# Patient Record
Sex: FEMALE | Race: WHITE | NOT HISPANIC OR LATINO | Employment: UNEMPLOYED | URBAN - METROPOLITAN AREA
[De-identification: names, ages, dates, MRNs, and addresses within clinical notes are randomized per-mention and may not be internally consistent; named-entity substitution may affect disease eponyms.]

---

## 2018-10-15 ENCOUNTER — OFFICE VISIT (OUTPATIENT)
Dept: OTOLARYNGOLOGY | Facility: CLINIC | Age: 48
End: 2018-10-15
Payer: COMMERCIAL

## 2018-10-15 VITALS — SYSTOLIC BLOOD PRESSURE: 149 MMHG | HEART RATE: 57 BPM | DIASTOLIC BLOOD PRESSURE: 87 MMHG | WEIGHT: 180.56 LBS

## 2018-10-15 DIAGNOSIS — J39.8 TRACHEAL STENOSIS: Primary | ICD-10-CM

## 2018-10-15 DIAGNOSIS — K21.9 GASTROESOPHAGEAL REFLUX DISEASE, ESOPHAGITIS PRESENCE NOT SPECIFIED: ICD-10-CM

## 2018-10-15 DIAGNOSIS — I10 HYPERTENSION, UNSPECIFIED TYPE: ICD-10-CM

## 2018-10-15 PROCEDURE — 99999 PR PBB SHADOW E&M-EST. PATIENT-LVL III: CPT | Mod: PBBFAC,,, | Performed by: OTOLARYNGOLOGY

## 2018-10-15 PROCEDURE — 99203 OFFICE O/P NEW LOW 30 MIN: CPT | Mod: 25,S$GLB,, | Performed by: OTOLARYNGOLOGY

## 2018-10-15 PROCEDURE — 31575 DIAGNOSTIC LARYNGOSCOPY: CPT | Mod: S$GLB,,, | Performed by: OTOLARYNGOLOGY

## 2018-10-15 RX ORDER — BISOPROLOL FUMARATE AND HYDROCHLOROTHIAZIDE 10; 6.25 MG/1; MG/1
1 TABLET ORAL DAILY
COMMUNITY
End: 2019-01-03 | Stop reason: SDUPTHER

## 2018-10-15 RX ORDER — LANSOPRAZOLE 30 MG/1
30 CAPSULE, DELAYED RELEASE ORAL DAILY
COMMUNITY
End: 2019-01-03

## 2018-10-15 RX ORDER — FEXOFENADINE HCL AND PSEUDOEPHEDRINE HCI 180; 240 MG/1; MG/1
1 TABLET, EXTENDED RELEASE ORAL DAILY
COMMUNITY
End: 2020-09-18

## 2018-10-15 RX ORDER — ZOLPIDEM TARTRATE 10 MG/1
5 TABLET ORAL NIGHTLY PRN
COMMUNITY
End: 2019-01-03 | Stop reason: SDUPTHER

## 2018-10-15 NOTE — PROGRESS NOTES
Chief Complaint   Patient presents with    Consult     tracheal stenosis       HPI   48 y.o. female presents with a history of idiopathic SGS/tracheal stenosis.  She recently moved to [facility] from [place] and has undergone multiple endoscopic procedures for tracheal stenosis dating back to 2006.  Her most recent procedure was in 12/12016.  She has no complaints today.  She is breathing well and does not feel that her breathing limits her activities.     Review of Systems   Constitutional: Negative for fatigue and unexpected weight change.   HENT: Per HPI.  Eyes: Negative for visual disturbance.   Respiratory: Negative for shortness of breath, hemoptysis   Cardiovascular: Negative for chest pain and palpitations.   Musculoskeletal: Negative for decreased ROM, back pain.   Skin: Negative for rash, sunburn, itching.   Neurological: Negative for dizziness and seizures.   Hematological: Negative for adenopathy. Does not bruise/bleed easily.   Endocrine: Negative for rapid weight loss/weight gain, heat/cold intolerance.     Past Medical History   Patient Active Problem List   Diagnosis    Tracheal stenosis    Hypertension    Gastroesophageal reflux disease           Past Surgical History   Past Surgical History:   Procedure Laterality Date    LARYNGOSCOPY      Multiple endoscopic procedures for SGS/tracheal stenosis dating to 2006.         Family History   History reviewed. No pertinent family history.        Social History   .  Social History     Socioeconomic History    Marital status: Unknown     Spouse name: Not on file    Number of children: Not on file    Years of education: Not on file    Highest education level: Not on file   Social Needs    Financial resource strain: Not on file    Food insecurity - worry: Not on file    Food insecurity - inability: Not on file    Transportation needs - medical: Not on file    Transportation needs - non-medical: Not on file   Occupational History    Not on file   Tobacco  Use    Smoking status: Never Smoker    Smokeless tobacco: Never Used   Substance and Sexual Activity    Alcohol use: Not on file    Drug use: Not on file    Sexual activity: Not on file   Other Topics Concern    Not on file   Social History Narrative    Not on file         Allergies   Review of patient's allergies indicates:   Allergen Reactions    Codeine            Physical Exam     Vitals:    10/15/18 1001   BP: (!) 149/87   Pulse: (!) 57         There is no height or weight on file to calculate BMI.      General: AOx3, NAD   Respiratory:  Symmetric chest rise, normal effort.  Soft, inconsistent inspiratory stridor.   Nose: No gross nasal septal deviation. Inferior Turbinates WNL bilaterally. No septal perforation. No masses/lesions.   Oral Cavity:  Oral Tongue mobile, no lesions noted. Hard Palate WNL. No buccal or FOM lesions.  Oropharynx:  No masses/lesions of the posterior pharyngeal wall. Tonsillar fossa without lesions. Soft palate without masses. Midline uvula.   Neck: No scars.  No cervical lymphadenopathy, thyromegaly or thyroid nodules.  Normal range of motion.    Face: House Brackmann I bilaterally.    Flex Naso Bharati Hypo Procedures #2    Procedure:  Diagnostic flexible nasopharyngoscopy, laryngoscopy and hypopharyngoscopy:    Routine preparation with local atomizer with 1% neosynephrine/pontocaine with customary flexible endoscope.    Nasopharynx:  No lesions.   Mucosa:  No lesions.   Adenoids:  Present.  Posterior Choanae:  Patent.  Eustachian Tubes:  Patent.  Posterior pharynx:  No lesions.  Larynx/hypopharynx:   Epiglottis:  No lesions, without edema.   AE Folds:  No lesions.   Vocal cords:  No polyps, nodules, ulcers or lesions.   Mobility:  Equal and normal bilateral.   Hypopharynx:  No lesions.   Piriform sinus:  No pooling, no lesions.   Post Cricoid:  No erythema, no edema.    Additional Findings: ~40% SGS, proximal tracheal stenosis.     Assessment/Plan  Problem List Items Addressed  This Visit        Cardiac/Vascular    Hypertension       ID    Tracheal stenosis - Primary     Idiopathic tracheal stenosis status post multiple endoscopic procedures.  She is nearly 2 years out from her last surgery and is doing quite well.  I will arrange for her to establish care with Dr. Mendoza.             GI    Gastroesophageal reflux disease

## 2018-10-15 NOTE — ASSESSMENT & PLAN NOTE
Idiopathic tracheal stenosis status post multiple endoscopic procedures.  She is nearly 2 years out from her last surgery and is doing quite well.  I will arrange for her to establish care with Dr. Mendoza.

## 2018-10-23 ENCOUNTER — TELEPHONE (OUTPATIENT)
Dept: OTOLARYNGOLOGY | Facility: CLINIC | Age: 48
End: 2018-10-23

## 2018-10-23 NOTE — TELEPHONE ENCOUNTER
----- Message from Josiane Soto RN sent at 10/22/2018  5:01 PM CDT -----  Contact: patient       ----- Message -----  From: Loraineazeem Mead  Sent: 10/22/2018   3:20 PM  To: Reji FRANCO Staff    Needs Advice    Reason for call: Patient would like to speak to nurse reg having records transferred to [facility] office.         Communication Preference: [phone]

## 2018-10-23 NOTE — TELEPHONE ENCOUNTER
----- Message from Loraine Mead sent at 10/23/2018 10:58 AM CDT -----  Contact: patient   Patient Returning Call from [facility]    Who Left Message for Patient: Rosalba    Communication Preference: [phone]

## 2018-10-29 ENCOUNTER — OFFICE VISIT (OUTPATIENT)
Dept: OTOLARYNGOLOGY | Facility: CLINIC | Age: 48
End: 2018-10-29
Payer: COMMERCIAL

## 2018-10-29 VITALS
WEIGHT: 182.56 LBS | TEMPERATURE: 99 F | HEART RATE: 82 BPM | SYSTOLIC BLOOD PRESSURE: 158 MMHG | DIASTOLIC BLOOD PRESSURE: 90 MMHG

## 2018-10-29 DIAGNOSIS — R49.0 DYSPHONIA: ICD-10-CM

## 2018-10-29 DIAGNOSIS — J39.8 TRACHEAL STENOSIS: Primary | ICD-10-CM

## 2018-10-29 DIAGNOSIS — J38.6 SUBGLOTTIC STENOSIS: ICD-10-CM

## 2018-10-29 PROCEDURE — 99215 OFFICE O/P EST HI 40 MIN: CPT | Mod: 25,S$GLB,, | Performed by: OTOLARYNGOLOGY

## 2018-10-29 PROCEDURE — 31575 DIAGNOSTIC LARYNGOSCOPY: CPT | Mod: S$GLB,,, | Performed by: OTOLARYNGOLOGY

## 2018-10-29 PROCEDURE — 99999 PR PBB SHADOW E&M-EST. PATIENT-LVL IV: CPT | Mod: PBBFAC,,, | Performed by: OTOLARYNGOLOGY

## 2018-10-29 RX ORDER — DEXAMETHASONE SODIUM PHOSPHATE 4 MG/ML
8 INJECTION, SOLUTION INTRA-ARTICULAR; INTRALESIONAL; INTRAMUSCULAR; INTRAVENOUS; SOFT TISSUE
Status: CANCELLED | OUTPATIENT
Start: 2018-10-29

## 2018-10-29 RX ORDER — LIDOCAINE HYDROCHLORIDE 10 MG/ML
1 INJECTION, SOLUTION EPIDURAL; INFILTRATION; INTRACAUDAL; PERINEURAL ONCE
Status: CANCELLED | OUTPATIENT
Start: 2018-10-29 | End: 2018-10-29

## 2018-10-29 NOTE — H&P (VIEW-ONLY)
"[facility]  Department of Otorhinolaryngology and Communication Sciences    Denise Fry is a 48 y.o. female who [facility] self-referred for further management of subglottic stenosis. She has idiopathic stenosis identified and treated at [facility]. She and her  recently relocated to [place] on account of his job.    She complains of shortness of breath. Difficulty getting air in. She gets winded easily on stairs or when walking the dog.  She breathes comfortably at rest.  Onset was gradual. Duration is 10+ years. Time course is intermittent. Symptoms are worsening over the last several months. Last surgery was 12/2016. Exacerbating factors include exertion. Alleviating factors include surgical management. She denies any associated symptoms.  Some coughing due to drainage at this time. They have obtained biopsies and have checked autoimmune labs. All negative.    She reports that in general she has felt better right after surgery. However, a couple times when post-surgery she has needed to go to the ER. Says that sometimes her airway would get dry and crusty. From what she can recall, they used [facility] every trip to the OR. Does recall that over time her stenosis has expanded to involve now "2 segments." On PPI under rec of ENT; has never seen GI.    Voice Handicap Index-10 (VHI-10) score is 0.   Reflux Symptom Index (RSI) score is 15.   Eating Assessment Tool-10 (EAT-10) score is 0.   Dyspnea Index (DI) score is 35.  Cough Severity Index (CSI) score is 0.    Past Medical History  SGS  HTN  Anxiety    Past Surgical History  She has a past surgical history that includes Laryngoscopy.   June 2008, Nov 2010, Jan 2013, Jan 2014, June 2014, Feb 2015, Aug 2015, Dec 2016: all DL with laser/dilation/[facility]     2005 Carpal tunnel and elbow surgery  1991, 1994: C section    Family History  Dad - diabetes, HTN, cancer  Brother - ?[employer]  Uncle - SGS?    Social History  She reports " that  has never smoked. she has never used smokeless tobacco.    Allergies  She is allergic to codeine.    Medications  She has a current medication list which includes the following prescription(s): bisoprolol-hydrochlorothiazide, fexofenadine-pseudoephedrine, lansoprazole, and zolpidem.    Review of Systems   Constitutional: Negative for fever.   HENT: Negative for sore throat.    Eyes: Negative for visual disturbance.   Respiratory: Positive for wheezing.    Cardiovascular: Negative for chest pain.   Gastrointestinal: Negative for nausea.   Musculoskeletal: Negative for arthralgias.   Skin: Negative for rash.   Neurological: Negative for tremors.   Hematological: Does not bruise/bleed easily.   Psychiatric/Behavioral: The patient is not nervous/anxious.           Objective:     BP (!) 158/90   Pulse 82   Temp 98.8 °F (37.1 °C)   Wt 82.8 kg (182 lb 8.7 oz)      Physical Exam    Constitutional: comfortable, well dressed  Psychiatric: appropriate affect  Respiratory: comfortably breathing, symmetric chest rise, no stridor; trace/faint inspiratory stridor  Voice: mild variable breathiness  Cardiovascular: upper extremities non-edematous  Lymphatic: no cervical lymphadenopathy  Neurologic: alert and oriented to time, place, person, and situation; cranial nerves 3-12 grossly intact  Head: normocephalic  Eyes: conjunctivae and sclerae clear  Ears: normal pinnae, normal external auditory canals, tympanic membranes intact  Nose: mucosa pink and noncongested, no masses, no mucopurulence, no polyps  Oral cavity / oropharynx: no mucosal lesions  Neck: soft, full range of motion, laryngotracheal complex palpable with appropriate landmarks, larynx elevates on swallowing  Indirect laryngoscopy: limited due to gag    Procedure  Flexible Laryngoscopy (27129): Laryngoscopy is indicated for assessment of upper aerodigestive structure and function. This was carried out transnasally with a distal chip videoendoscope. After verbal  consent was obtained, the patient was positioned and the nose was topically decongested with 1% phenylephrine and topically anesthetized with 4% lidocaine. The endoscope was passed through the most patent nasal cavity and positioned to image the nasopharynx, larynx, and hypopharynx in detail. The following features were examined: nasopharyngeal, laryngeal, hypopharyngeal masses; velopharyngeal strength, closure, and symmetry of motion; vocal fold range and symmetry of motion; laryngeal mucosal edema, erythema, inflammation, and hydration; salivary pooling; and gross laryngeal sensation. The equipment was removed. The patient tolerated the procedure well without complication. All findings were normal except:  - corkscrewing stenosis beginning at posterior glottis, primarily right sided, tracking through subglottis into proximal trachea; airway lumen narrowest at distalmost end, where lumen estimated at ~5mm  - right vocal fold with slightly limited lateral excursion and slightly elongated        Assessment:     Denise Fry is a 48 y.o. female with idiopathic laryngotracheal stenosis, primarily centered at the subglottis/trachea, though with some apparent encroachment into the posterior glottis.       Plan:        I had a discussion with the patient regarding her condition and the further workup and management options.      I recommended obtaining an updated anatomical assessment of her airway framework with a CT scan. Furthermore, I offered her to return to the OR for rigid airway endoscopy, possible laser incision/excision, possible dilation, possible steroid injection. Due to her prior history of sometimes retuning to the ED with obstructive crusts, I recommended refraining from use of topical mitomycin C. I spoke with her about the potential necessity for tracheostomy, either intraoperatively or postoperatively, as is always the case when considering endoscopic airway surgery. I discussed the risks, benefits  and alternatives to surgery with the patient, as well as the expected postoperative course. Risks included but were not limited to pain; bleeding; infection; scarring; worsening of voice; worsening of swallowing; aspiration; recurrence; need for additional and/or more extensive procedures; laryngotracheal rupture; pharyngoesophageal leak/fistula/mediastinitis; oral/dental problems (pain, laceration, broken or missing teeth); jaw joint problems (pain, dislocation); and tongue problems (pain, laceration, numbness, weakness, taste disturbance). Also inherent in the procedure are the risks of general anesthesia, including but not limited to cardiovascular complications (heart attack, arrhythmia); pulmonary (respiratory failure); neurologic (stroke); and death. I also explained that, if the laser is used, it presents the risks of vision loss and fire. I gave her the opportunity to ask questions and I answered all of them to her satisfaction. She wishes to proceed. We will arrange this in the coming weeks. Overnight observation is recommended. She will have preop testing triage by the anesthesiology team.    Depending on her progress, she may benefit from office-based steroid injections afterwards.    All questions were answered, and the patient is in agreement with the above.     This visit was 40 minutes in duration, with over 50% of the time spent in direct face-to-face counseling and coordination of care regarding the issues outlined above.    August Mendoza M.D.  [facility]  Department of Otorhinolaryngology and Communication Sciences

## 2018-10-29 NOTE — PRE-PROCEDURE INSTRUCTIONS
Pre-op interview completed via phone.  PreOp Instructions given:  - Medication information (what to hold and what to take)  - NPO guidelines. Follow surgeon's instructions.  - Arrival place directions given  - Time to be given the day before procedure by the  Surgeon's Office  - Bathing with antibacterial soap/Hibiclens   - Don't wear any jewelry or bring any valuables AM of surgery  - No makeup or moisturizer to face  - No perfume/cologne, powder, lotions or aftershave    Food and Beverage Instructions  1. Stop ALL solid food, gum, candy (including vitamins) 8 hours before surgery/procedure time.  2. The patient should be ENCOURAGED to drink carbohydrate-rich clear liquids (sports drinks, clear juices) until 2 hours prior to surgery/procedure time.  3. CLEAR liquids include only water, black coffee NO creamer, clear oral rehydration drinks, clear sports drinks or clear fruit juices (no orange juice, no pulpy juices, no apple cider). Advise patients if they can read newsprint through the liquid, it qualifies as clear liquid.   4. IF IN DOUBT, drink water instead.   5. NOTHING  TO DRINK 2 hours before to arrival for surgery/procedure time. If you are told to take medication on the morning of surgery, it may be taken with a sip of water.     Follow surgeon's pre-op instructions including food and beverage instructions.    Patient was instructed to call and update the PreOp nurse about any changes to health, changes to medication, new office visits or hospitalizations between now and surgery.     Pt. verbalized understanding

## 2018-10-29 NOTE — PATIENT INSTRUCTIONS
Follow up for CT scan, then for surgery  Consider office-based steroid injections afterwards          MICROLARYNGOSCOPY    Description  Laryngoscopy is a procedure in which the surgeon closely examines the larynx (voice box). The key instrument for laryngoscopy is the laryngoscope, which is a hollow metal tube inserted into the mouth. Microlaryngoscopy entails--at minimum--a magnified examination of the larynx using a microscope and/or telescopes. This is performed in the operating room. This allows the surgeon to achieve a diagnosis and also to perform precise treatment for problems on the vocal folds (vocal cords) or other parts of the larynx. Additional interventions may be performed in conjunction with microlaryngoscopy. Common interventions include but are not limited to   Biopsy   Removal of abnormal tissue (polyps, cysts, nodules, leukoplakia)   Resection of cancer   Laser treatment of abnormal tissue (papilloma, leukoplakia)   Vocal fold injection augmentation   Injection of medication (steroid, Avastin)    Instructions: before the procedure  1. NPO after midnight: This is a medical expression for nothing to eat or drink after midnight. It is important to refrain from eating or drinking anything after midnight the night before your surgery.   2. Please refrain from taking any anti-platelet medications such as aspirin; ibuprofen (Advil, Motrin); Aleve; or Plavix (clopidogrel) for 7 days prior to the procedure.  3. If you usually take Coumadin (warfarin), you may need to stop using this a few days prior to the injection.   4. If you are any type of blood thinning (anti-platelet or anti-coagulant) medication and it is not clear what you should do, please clarify this with your surgeon ahead of time. In some cases we rely on other physicians such as cardiologists or hematologists to help with these decisions.  5. Diabetes precautions: If you are usually take oral diabetes medications (such as  metformin), refrain from taking your morning dose the day of the procedure and use sliding-scale insulin for blood sugar control.  6. On the morning of your procedure, it is OK to take your other regular morning medications with a small sip of water. It is particularly important to take any medications that treat heart problems (such as blood pressure, heart rate, heart rhythm) and lung problems  (asthma, COPD). Otherwise, please remember: nothing to eat or drink after midnight.      What to expect during the procedure  Microlaryngoscopy is performed in the operating room while you are asleep under general anesthesia. In most instances, you can expect to be under general anesthesia for approximately 1 to 1 ½ hours.  Nevertheless, the duration of the procedure varies depending on the indication for surgery, intraoperative findings, and other patient-specific/anatomic issues. Our primary concerns are your safety and comfort. Our secondary goal is to provide you with the best possible surgical treatment for your problem. Your surgery will take as long as necessary to accomplish these goals.    What to expect afterwards  The laryngoscope is inserted through the mouth and presses on the tongue. The most common postoperative issues patients encounter are related to the laryngoscope being positioned in the mouth. The extent of these issues is related to patient-specific anatomic issues, the indications for surgery, and the duration of the procedure.    Pain. We will do everything we can to make sure you are as comfortable as possible. Most patients experience very little discomfort after this surgery. Nevertheless, you should expect some soreness in the mouth and throat. Because the ear and the throat share the same sensory nerve, you may also experience some discomfort in the ear. The discomfort is usually worst for the first 48-72 hours and usually resolves within a week. You will be prescribed pain medication, but most  patients are sufficiently comfortable with plain Tylenol as needed.    Laceration. Some patients may notice a small cut in the tongue or in another part of the mouth or throat. This may result in a minor about of blood-tinged saliva for the first 24 hours. This usually heals on its own over the course of a week.    Other tongue problems. As the scope presses down on the tongue, the taste buds get compressed. In addition, sometimes the nerves to the tongue also get compressed. As a result, some patients notice a disturbance in taste, numbness of the tongue, or (even more rarely) weakness of the tongue after surgery. Although sometimes it may take several weeks to months for the problem to completely go away, the tongue problems are temporary in almost every instance.    Tooth problems. Reinforced mouth guards are placed on the teeth to protect them during the surgery and we give a great deal of care and attention to minimizing any pressure on the teeth. However, a chipped or cracked tooth, loss of a tooth, and/or other tooth irregularities are rare but well-recognized complications of laryngoscopy.    Jaw problems. You may experience some pain in the jaw joints (in front of the ears). Even less frequent would be dislocation of the joint. This usually occurs in patients who already have jaw problems.    Instructions: after the procedure  1. Please take the prescribed pain medication as directed. If you are still having discomfort after the prescription runs out, or if you would rather not take a prescription narcotic pain medicine, you may instead take plain acetaminophen (Tylenol) as directed on the packaging.  2. Voice rest. In many instances, we will recommend COMPLETE VOICE REST until your follow-up visit with your surgeon. This means COMPLETE SILENCE - NO TALKING, NO COUGHING, NO WHISPERING. Please see additional information on how to manage complete voice rest. Even if voice rest is not prescribed, for the first  week, you should avoid speaking over heavy background noise or in a very loud voice.   3. Please call our office at [phone] if  - You have a temperature above 101°F  - You develop Increasing pain not relieved by medications  - You have any other questions or concerns  4. Please go immediately to the nearest emergency room if you are experiencing  - Shortness of breath  - Difficulty breathing  - Severe bleeding

## 2018-10-29 NOTE — PROGRESS NOTES
"[facility]  Department of Otorhinolaryngology and Communication Sciences    Denise Fry is a 48 y.o. female who [facility] the [facility] self-referred for further management of subglottic stenosis. She has idiopathic stenosis identified and treated at [facility]. She and her  recently relocated to [place] on account of his job.    She complains of shortness of breath. Difficulty getting air in. She gets winded easily on stairs or when walking the dog.  She breathes comfortably at rest.  Onset was gradual. Duration is 10+ years. Time course is intermittent. Symptoms are worsening over the last several months. Last surgery was 12/2016. Exacerbating factors include exertion. Alleviating factors include surgical management. She denies any associated symptoms.  Some coughing due to drainage at this time. They have obtained biopsies and have checked autoimmune labs. All negative.    She reports that in general she has felt better right after surgery. However, a couple times when post-surgery she has needed to go to the ER. Says that sometimes her airway would get dry and crusty. From what she can recall, they used [facility] every trip to the OR. Does recall that over time her stenosis has expanded to involve now "2 segments." On PPI under rec of ENT; has never seen GI.    Voice Handicap Index-10 (VHI-10) score is 0.   Reflux Symptom Index (RSI) score is 15.   Eating Assessment Tool-10 (EAT-10) score is 0.   Dyspnea Index (DI) score is 35.  Cough Severity Index (CSI) score is 0.    Past Medical History  SGS  HTN  Anxiety    Past Surgical History  She has a past surgical history that includes Laryngoscopy.   June 2008, Nov 2010, Jan 2013, Jan 2014, June 2014, Feb 2015, Aug 2015, Dec 2016: all DL with laser/dilation/[facility]     2005 Carpal tunnel and elbow surgery  1991, 1994: C section    Family History  Dad - diabetes, HTN, cancer  Brother - ?[employer]  Uncle - SGS?    Social History  She reports " that  has never smoked. she has never used smokeless tobacco.    Allergies  She is allergic to codeine.    Medications  She has a current medication list which includes the following prescription(s): bisoprolol-hydrochlorothiazide, fexofenadine-pseudoephedrine, lansoprazole, and zolpidem.    Review of Systems   Constitutional: Negative for fever.   HENT: Negative for sore throat.    Eyes: Negative for visual disturbance.   Respiratory: Positive for wheezing.    Cardiovascular: Negative for chest pain.   Gastrointestinal: Negative for nausea.   Musculoskeletal: Negative for arthralgias.   Skin: Negative for rash.   Neurological: Negative for tremors.   Hematological: Does not bruise/bleed easily.   Psychiatric/Behavioral: The patient is not nervous/anxious.           Objective:     BP (!) 158/90   Pulse 82   Temp 98.8 °F (37.1 °C)   Wt 82.8 kg (182 lb 8.7 oz)      Physical Exam    Constitutional: comfortable, well dressed  Psychiatric: appropriate affect  Respiratory: comfortably breathing, symmetric chest rise, no stridor; trace/faint inspiratory stridor  Voice: mild variable breathiness  Cardiovascular: upper extremities non-edematous  Lymphatic: no cervical lymphadenopathy  Neurologic: alert and oriented to time, place, person, and situation; cranial nerves 3-12 grossly intact  Head: normocephalic  Eyes: conjunctivae and sclerae clear  Ears: normal pinnae, normal external auditory canals, tympanic membranes intact  Nose: mucosa pink and noncongested, no masses, no mucopurulence, no polyps  Oral cavity / oropharynx: no mucosal lesions  Neck: soft, full range of motion, laryngotracheal complex palpable with appropriate landmarks, larynx elevates on swallowing  Indirect laryngoscopy: limited due to gag    Procedure  Flexible Laryngoscopy (09111): Laryngoscopy is indicated for assessment of upper aerodigestive structure and function. This was carried out transnasally with a distal chip videoendoscope. After verbal  consent was obtained, the patient was positioned and the nose was topically decongested with 1% phenylephrine and topically anesthetized with 4% lidocaine. The endoscope was passed through the most patent nasal cavity and positioned to image the nasopharynx, larynx, and hypopharynx in detail. The following features were examined: nasopharyngeal, laryngeal, hypopharyngeal masses; velopharyngeal strength, closure, and symmetry of motion; vocal fold range and symmetry of motion; laryngeal mucosal edema, erythema, inflammation, and hydration; salivary pooling; and gross laryngeal sensation. The equipment was removed. The patient tolerated the procedure well without complication. All findings were normal except:  - corkscrewing stenosis beginning at posterior glottis, primarily right sided, tracking through subglottis into proximal trachea; airway lumen narrowest at distalmost end, where lumen estimated at ~5mm  - right vocal fold with slightly limited lateral excursion and slightly elongated        Assessment:     Denise Fry is a 48 y.o. female with idiopathic laryngotracheal stenosis, primarily centered at the subglottis/trachea, though with some apparent encroachment into the posterior glottis.       Plan:        I had a discussion with the patient regarding her condition and the further workup and management options.      I recommended obtaining an updated anatomical assessment of her airway framework with a CT scan. Furthermore, I offered her to return to the OR for rigid airway endoscopy, possible laser incision/excision, possible dilation, possible steroid injection. Due to her prior history of sometimes retuning to the ED with obstructive crusts, I recommended refraining from use of topical mitomycin C. I spoke with her about the potential necessity for tracheostomy, either intraoperatively or postoperatively, as is always the case when considering endoscopic airway surgery. I discussed the risks, benefits  and alternatives to surgery with the patient, as well as the expected postoperative course. Risks included but were not limited to pain; bleeding; infection; scarring; worsening of voice; worsening of swallowing; aspiration; recurrence; need for additional and/or more extensive procedures; laryngotracheal rupture; pharyngoesophageal leak/fistula/mediastinitis; oral/dental problems (pain, laceration, broken or missing teeth); jaw joint problems (pain, dislocation); and tongue problems (pain, laceration, numbness, weakness, taste disturbance). Also inherent in the procedure are the risks of general anesthesia, including but not limited to cardiovascular complications (heart attack, arrhythmia); pulmonary (respiratory failure); neurologic (stroke); and death. I also explained that, if the laser is used, it presents the risks of vision loss and fire. I gave her the opportunity to ask questions and I answered all of them to her satisfaction. She wishes to proceed. We will arrange this in the coming weeks. Overnight observation is recommended. She will have preop testing triage by the anesthesiology team.    Depending on her progress, she may benefit from office-based steroid injections afterwards.    All questions were answered, and the patient is in agreement with the above.     This visit was 40 minutes in duration, with over 50% of the time spent in direct face-to-face counseling and coordination of care regarding the issues outlined above.    August Mendoza M.D.  [facility]  Department of Otorhinolaryngology and Communication Sciences

## 2018-10-31 ENCOUNTER — HOSPITAL ENCOUNTER (OUTPATIENT)
Dept: RADIOLOGY | Facility: HOSPITAL | Age: 48
Discharge: HOME | End: 2018-10-31
Attending: OTOLARYNGOLOGY
Payer: COMMERCIAL

## 2018-10-31 DIAGNOSIS — J38.6 SUBGLOTTIC STENOSIS: ICD-10-CM

## 2018-10-31 DIAGNOSIS — R49.0 DYSPHONIA: ICD-10-CM

## 2018-10-31 DIAGNOSIS — J39.8 TRACHEAL STENOSIS: ICD-10-CM

## 2018-10-31 PROCEDURE — 70490 CT SOFT TISSUE NECK W/O DYE: CPT | Mod: 26,,, | Performed by: RADIOLOGY

## 2018-10-31 PROCEDURE — 70490 CT SOFT TISSUE NECK W/O DYE: CPT | Mod: TC,PO

## 2018-11-01 NOTE — PROGRESS NOTES
Reviewed CT results w pt. Corroborates office discussion--I note involvement of trachea, subglottis, glottis, with framework contracture at the tracheal level and possible deformity of the cricoid, either from disease process or prior interventions. Plan remains as discussed in the office.

## 2018-11-02 ENCOUNTER — TELEPHONE (OUTPATIENT)
Dept: OTOLARYNGOLOGY | Facility: CLINIC | Age: 48
End: 2018-11-02

## 2018-11-05 ENCOUNTER — ANESTHESIA EVENT (OUTPATIENT)
Dept: SURGERY | Facility: HOSPITAL | Age: 48
End: 2018-11-05
Payer: COMMERCIAL

## 2018-11-05 NOTE — PRE ADMISSION SCREENING
Anesthesia Assessment: Preoperative EQUATION    Planned Procedure: Procedure(s) (LRB):  Suspension microlaryngoscopy, CO2 laser excision, steroid injection, balloon dilation.  JET VENTILATION. (N/A)  Requested Anesthesia Type:General  Surgeon: August Mendoza MD  Service: ENT  Known or anticipated Date of Surgery:11/13/2018    Surgeon notes: reviewed    Electronic QUestionnaire Assessment completed via nurse interview with patient.      NO AQ    Triage considerations:     The patient has no apparent active cardiac condition (No unstable coronary Syndrome such as severe unstable angina or recent [<1 month] myocardial infarction, decompensated CHF, severe valvular   disease or significant arrhythmia)    Previous anesthesia records:GETA, No problems and Not available    Subspecialty notes: ENT    Other important co-morbidities:       Tracheal stenosis   Hypertension   Gastroesophageal reflux disease   Subglottic stenosis   Hx PONV    Tests already available:  No recent tests.            Instructions given. (See in Nurse's note)    Optimization:  Anesthesia Preop Clinic Assessment  Indicated-not required for this surgery        Plan:    Testing:  none needed     Patient  has previously scheduled Medical Appointment:  10/31 CT    Navigation: Tests Scheduled. none              Straight Line to surgery.                Sheila Leiva RN

## 2018-11-05 NOTE — ANESTHESIA PREPROCEDURE EVALUATION
Anesthesia Assessment: Preoperative EQUATION     Planned Procedure: Procedure(s) (LRB):  Suspension microlaryngoscopy, CO2 laser excision, steroid injection, balloon dilation.  JET VENTILATION. (N/A)  Requested Anesthesia Type:General  Surgeon: August Mendoza MD  Service: ENT  Known or anticipated Date of Surgery:11/13/2018     Surgeon notes: reviewed     Electronic QUestionnaire Assessment completed via nurse interview with patient.      NO AQ     Triage considerations:      The patient has no apparent active cardiac condition (No unstable coronary Syndrome such as severe unstable angina or recent [<1 month] myocardial infarction, decompensated CHF, severe valvular   disease or significant arrhythmia)     Previous anesthesia records:GETA, No problems and Not available     Subspecialty notes: ENT     Other important co-morbidities:         Tracheal stenosis   Hypertension   Gastroesophageal reflux disease   Subglottic stenosis   Hx PONV     Tests already available:  No recent tests.                            Instructions given. (See in Nurse's note)     Optimization:  Anesthesia Preop Clinic Assessment  Indicated-not required for this surgery                Plan:    Testing:  none needed                           Patient  has previously scheduled Medical Appointment:  10/31 CT     Navigation: Tests Scheduled. none                        Straight Line to surgery.                           Sheila Leiva RN                                             Electronically signed by Sheila Leiva RN at 11/5/2018  5:48 PM                                                                                                                11/05/2018  Denise Fry is a 48 y.o., female.    Anesthesia Evaluation    I have reviewed the Patient Summary Reports.     I have reviewed the Medications.     Review of Systems  Anesthesia Hx:  Hx of Anesthetic complications  History of prior surgery of interest to airway  management or planning: Previous anesthesia: General Personal Hx of Anesthesia complications, Post-Operative Nausea/Vomiting   Social:  Non-Smoker    Hematology/Oncology:  Hematology Normal   Oncology Normal     EENT/Dental:EENT/Dental Normal   Cardiovascular:   Hypertension    Pulmonary:  Pulmonary Normal    Renal/:  Renal/ Normal     Hepatic/GI:   GERD    Musculoskeletal:  Musculoskeletal Normal    Endocrine:  Endocrine Normal    Dermatological:  Skin Normal    Psych:  Psychiatric Normal           Physical Exam  General:  Well nourished    Airway/Jaw/Neck:  Airway Findings: Mouth Opening: Normal Tongue: Normal  General Airway Assessment: Adult  Mallampati: II  TM Distance: Normal, at least 6 cm  Jaw/Neck Findings:      Dental:  Dental Findings: In tact        Mental Status:  Mental Status Findings:  Cooperative, Alert and Oriented         Anesthesia Plan  Type of Anesthesia, risks & benefits discussed:  Anesthesia Type:  general  Patient's Preference: GA  Intra-op Monitoring Plan: standard ASA monitors  Intra-op Monitoring Plan Comments:   Post Op Pain Control Plan: multimodal analgesia  Post Op Pain Control Plan Comments:   Induction:   IV  Beta Blocker:  Patient is on a Beta-Blocker and has received one dose within the past 24 hours (No further documentation required).       Informed Consent: Patient understands risks and agrees with Anesthesia plan.  Questions answered. Anesthesia consent signed with patient.  ASA Score: 3     Day of Surgery Review of History & Physical:  There are no significant changes.  H&P update referred to the surgeon.         Ready For Surgery From Anesthesia Perspective.

## 2018-11-12 ENCOUNTER — TELEPHONE (OUTPATIENT)
Dept: OTOLARYNGOLOGY | Facility: CLINIC | Age: 48
End: 2018-11-12

## 2018-11-13 ENCOUNTER — ANESTHESIA (OUTPATIENT)
Dept: SURGERY | Facility: HOSPITAL | Age: 48
End: 2018-11-13
Payer: COMMERCIAL

## 2018-11-13 ENCOUNTER — HOSPITAL ENCOUNTER (OUTPATIENT)
Facility: HOSPITAL | Age: 48
Discharge: HOME | End: 2018-11-14
Attending: OTOLARYNGOLOGY | Admitting: OTOLARYNGOLOGY
Payer: COMMERCIAL

## 2018-11-13 DIAGNOSIS — J38.6 SUBGLOTTIC STENOSIS: ICD-10-CM

## 2018-11-13 DIAGNOSIS — J39.8 TRACHEAL STENOSIS: ICD-10-CM

## 2018-11-13 LAB
B-HCG UR QL: NEGATIVE
CTP QC/QA: YES

## 2018-11-13 PROCEDURE — 94761 N-INVAS EAR/PLS OXIMETRY MLT: CPT

## 2018-11-13 PROCEDURE — D9220A PRA ANESTHESIA: Mod: ANES,,, | Performed by: ANESTHESIOLOGY

## 2018-11-13 PROCEDURE — 25000003 PHARM REV CODE 250: Performed by: OTOLARYNGOLOGY

## 2018-11-13 PROCEDURE — D9220A PRA ANESTHESIA: Mod: CRNA,,, | Performed by: NURSE ANESTHETIST, CERTIFIED REGISTERED

## 2018-11-13 PROCEDURE — 31622 DX BRONCHOSCOPE/WASH: CPT | Mod: 51,,, | Performed by: OTOLARYNGOLOGY

## 2018-11-13 PROCEDURE — 31528 LARYNGOSCOPY AND DILATION: CPT | Mod: 51,,, | Performed by: OTOLARYNGOLOGY

## 2018-11-13 PROCEDURE — 36000708 HC OR TIME LEV III 1ST 15 MIN: Performed by: OTOLARYNGOLOGY

## 2018-11-13 PROCEDURE — 63600175 PHARM REV CODE 636 W HCPCS: Performed by: STUDENT IN AN ORGANIZED HEALTH CARE EDUCATION/TRAINING PROGRAM

## 2018-11-13 PROCEDURE — C1726 CATH, BAL DIL, NON-VASCULAR: HCPCS | Performed by: OTOLARYNGOLOGY

## 2018-11-13 PROCEDURE — 63600175 PHARM REV CODE 636 W HCPCS: Performed by: NURSE ANESTHETIST, CERTIFIED REGISTERED

## 2018-11-13 PROCEDURE — 25000003 PHARM REV CODE 250: Performed by: NURSE ANESTHETIST, CERTIFIED REGISTERED

## 2018-11-13 PROCEDURE — 27201423 OPTIME MED/SURG SUP & DEVICES STERILE SUPPLY: Performed by: OTOLARYNGOLOGY

## 2018-11-13 PROCEDURE — 37000009 HC ANESTHESIA EA ADD 15 MINS: Performed by: OTOLARYNGOLOGY

## 2018-11-13 PROCEDURE — 63600175 PHARM REV CODE 636 W HCPCS: Performed by: OTOLARYNGOLOGY

## 2018-11-13 PROCEDURE — 71000039 HC RECOVERY, EACH ADD'L HOUR: Performed by: OTOLARYNGOLOGY

## 2018-11-13 PROCEDURE — 36000709 HC OR TIME LEV III EA ADD 15 MIN: Performed by: OTOLARYNGOLOGY

## 2018-11-13 PROCEDURE — 31571 LARYNGOSCOP W/VC INJ + SCOPE: CPT | Mod: ,,, | Performed by: OTOLARYNGOLOGY

## 2018-11-13 PROCEDURE — 71000033 HC RECOVERY, INTIAL HOUR: Performed by: OTOLARYNGOLOGY

## 2018-11-13 PROCEDURE — 37000008 HC ANESTHESIA 1ST 15 MINUTES: Performed by: OTOLARYNGOLOGY

## 2018-11-13 PROCEDURE — 25000003 PHARM REV CODE 250: Performed by: STUDENT IN AN ORGANIZED HEALTH CARE EDUCATION/TRAINING PROGRAM

## 2018-11-13 PROCEDURE — S0028 INJECTION, FAMOTIDINE, 20 MG: HCPCS | Performed by: NURSE ANESTHETIST, CERTIFIED REGISTERED

## 2018-11-13 PROCEDURE — 81025 URINE PREGNANCY TEST: CPT | Performed by: OTOLARYNGOLOGY

## 2018-11-13 PROCEDURE — 27000221 HC OXYGEN, UP TO 24 HOURS

## 2018-11-13 RX ORDER — ONDANSETRON 2 MG/ML
INJECTION INTRAMUSCULAR; INTRAVENOUS
Status: DISCONTINUED | OUTPATIENT
Start: 2018-11-13 | End: 2018-11-13

## 2018-11-13 RX ORDER — FAMOTIDINE 10 MG/ML
INJECTION INTRAVENOUS
Status: DISCONTINUED | OUTPATIENT
Start: 2018-11-13 | End: 2018-11-13

## 2018-11-13 RX ORDER — LIDOCAINE HYDROCHLORIDE 40 MG/ML
INJECTION, SOLUTION RETROBULBAR
Status: DISCONTINUED | OUTPATIENT
Start: 2018-11-13 | End: 2018-11-13 | Stop reason: HOSPADM

## 2018-11-13 RX ORDER — ROCURONIUM BROMIDE 10 MG/ML
INJECTION, SOLUTION INTRAVENOUS
Status: DISCONTINUED | OUTPATIENT
Start: 2018-11-13 | End: 2018-11-13

## 2018-11-13 RX ORDER — GLYCOPYRROLATE 0.2 MG/ML
INJECTION INTRAMUSCULAR; INTRAVENOUS
Status: DISCONTINUED | OUTPATIENT
Start: 2018-11-13 | End: 2018-11-13

## 2018-11-13 RX ORDER — METOPROLOL TARTRATE 1 MG/ML
INJECTION, SOLUTION INTRAVENOUS
Status: DISCONTINUED | OUTPATIENT
Start: 2018-11-13 | End: 2018-11-13

## 2018-11-13 RX ORDER — DEXAMETHASONE SODIUM PHOSPHATE 4 MG/ML
8 INJECTION, SOLUTION INTRA-ARTICULAR; INTRALESIONAL; INTRAMUSCULAR; INTRAVENOUS; SOFT TISSUE EVERY 8 HOURS
Status: DISCONTINUED | OUTPATIENT
Start: 2018-11-13 | End: 2018-11-14 | Stop reason: HOSPADM

## 2018-11-13 RX ORDER — PROPOFOL 10 MG/ML
VIAL (ML) INTRAVENOUS
Status: DISCONTINUED | OUTPATIENT
Start: 2018-11-13 | End: 2018-11-13

## 2018-11-13 RX ORDER — LIDOCAINE HCL/PF 100 MG/5ML
SYRINGE (ML) INTRAVENOUS
Status: DISCONTINUED | OUTPATIENT
Start: 2018-11-13 | End: 2018-11-13

## 2018-11-13 RX ORDER — PANTOPRAZOLE SODIUM 40 MG/1
40 TABLET, DELAYED RELEASE ORAL DAILY
Status: DISCONTINUED | OUTPATIENT
Start: 2018-11-14 | End: 2018-11-14 | Stop reason: HOSPADM

## 2018-11-13 RX ORDER — SODIUM CHLORIDE 9 MG/ML
INJECTION, SOLUTION INTRAVENOUS CONTINUOUS PRN
Status: DISCONTINUED | OUTPATIENT
Start: 2018-11-13 | End: 2018-11-13

## 2018-11-13 RX ORDER — EPINEPHRINE 1 MG/ML
INJECTION, SOLUTION INTRACARDIAC; INTRAMUSCULAR; INTRAVENOUS; SUBCUTANEOUS
Status: DISCONTINUED | OUTPATIENT
Start: 2018-11-13 | End: 2018-11-13 | Stop reason: HOSPADM

## 2018-11-13 RX ORDER — IBUPROFEN 400 MG/1
800 TABLET ORAL EVERY 6 HOURS PRN
Status: DISCONTINUED | OUTPATIENT
Start: 2018-11-13 | End: 2018-11-14 | Stop reason: HOSPADM

## 2018-11-13 RX ORDER — ONDANSETRON 4 MG/1
4 TABLET, ORALLY DISINTEGRATING ORAL EVERY 6 HOURS PRN
Status: DISCONTINUED | OUTPATIENT
Start: 2018-11-13 | End: 2018-11-14 | Stop reason: HOSPADM

## 2018-11-13 RX ORDER — HYDROCHLOROTHIAZIDE 12.5 MG/1
6.25 TABLET ORAL DAILY
Status: DISCONTINUED | OUTPATIENT
Start: 2018-11-13 | End: 2018-11-13

## 2018-11-13 RX ORDER — PROPOFOL 10 MG/ML
VIAL (ML) INTRAVENOUS CONTINUOUS PRN
Status: DISCONTINUED | OUTPATIENT
Start: 2018-11-13 | End: 2018-11-13

## 2018-11-13 RX ORDER — VALACYCLOVIR HYDROCHLORIDE 500 MG/1
500 TABLET, FILM COATED ORAL 2 TIMES DAILY
Status: DISCONTINUED | OUTPATIENT
Start: 2018-11-13 | End: 2018-11-13

## 2018-11-13 RX ORDER — ACETAMINOPHEN 500 MG
1000 TABLET ORAL EVERY 6 HOURS PRN
Status: DISCONTINUED | OUTPATIENT
Start: 2018-11-13 | End: 2018-11-14 | Stop reason: HOSPADM

## 2018-11-13 RX ORDER — FENTANYL CITRATE 50 UG/ML
INJECTION, SOLUTION INTRAMUSCULAR; INTRAVENOUS
Status: DISCONTINUED | OUTPATIENT
Start: 2018-11-13 | End: 2018-11-13

## 2018-11-13 RX ORDER — SODIUM CHLORIDE 0.9 % (FLUSH) 0.9 %
3 SYRINGE (ML) INJECTION
Status: DISCONTINUED | OUTPATIENT
Start: 2018-11-13 | End: 2018-11-14 | Stop reason: HOSPADM

## 2018-11-13 RX ORDER — CEFAZOLIN SODIUM 1 G/3ML
2 INJECTION, POWDER, FOR SOLUTION INTRAMUSCULAR; INTRAVENOUS
Status: COMPLETED | OUTPATIENT
Start: 2018-11-13 | End: 2018-11-13

## 2018-11-13 RX ORDER — LIDOCAINE HYDROCHLORIDE 10 MG/ML
1 INJECTION, SOLUTION EPIDURAL; INFILTRATION; INTRACAUDAL; PERINEURAL ONCE
Status: COMPLETED | OUTPATIENT
Start: 2018-11-13 | End: 2018-11-13

## 2018-11-13 RX ORDER — ZOLPIDEM TARTRATE 5 MG/1
5 TABLET ORAL NIGHTLY PRN
Status: DISCONTINUED | OUTPATIENT
Start: 2018-11-13 | End: 2018-11-14 | Stop reason: HOSPADM

## 2018-11-13 RX ORDER — TRIAMCINOLONE ACETONIDE 40 MG/ML
INJECTION, SUSPENSION INTRA-ARTICULAR; INTRAMUSCULAR
Status: DISCONTINUED | OUTPATIENT
Start: 2018-11-13 | End: 2018-11-13 | Stop reason: HOSPADM

## 2018-11-13 RX ORDER — DEXAMETHASONE SODIUM PHOSPHATE 4 MG/ML
INJECTION, SOLUTION INTRA-ARTICULAR; INTRALESIONAL; INTRAMUSCULAR; INTRAVENOUS; SOFT TISSUE
Status: DISCONTINUED | OUTPATIENT
Start: 2018-11-13 | End: 2018-11-13

## 2018-11-13 RX ORDER — HYDRALAZINE HYDROCHLORIDE 20 MG/ML
10 INJECTION INTRAMUSCULAR; INTRAVENOUS EVERY 6 HOURS PRN
Status: DISCONTINUED | OUTPATIENT
Start: 2018-11-13 | End: 2018-11-14 | Stop reason: HOSPADM

## 2018-11-13 RX ORDER — MIDAZOLAM HYDROCHLORIDE 1 MG/ML
INJECTION, SOLUTION INTRAMUSCULAR; INTRAVENOUS
Status: DISCONTINUED | OUTPATIENT
Start: 2018-11-13 | End: 2018-11-13

## 2018-11-13 RX ORDER — DEXAMETHASONE SODIUM PHOSPHATE 4 MG/ML
8 INJECTION, SOLUTION INTRA-ARTICULAR; INTRALESIONAL; INTRAMUSCULAR; INTRAVENOUS; SOFT TISSUE
Status: DISCONTINUED | OUTPATIENT
Start: 2018-11-13 | End: 2018-11-13 | Stop reason: HOSPADM

## 2018-11-13 RX ORDER — BISOPROLOL FUMARATE 5 MG/1
10 TABLET, FILM COATED ORAL DAILY
Status: DISCONTINUED | OUTPATIENT
Start: 2018-11-13 | End: 2018-11-14 | Stop reason: HOSPADM

## 2018-11-13 RX ORDER — VALACYCLOVIR HYDROCHLORIDE 500 MG/1
500 TABLET, FILM COATED ORAL 2 TIMES DAILY
COMMUNITY
End: 2019-01-03 | Stop reason: SDUPTHER

## 2018-11-13 RX ORDER — KETAMINE HYDROCHLORIDE 10 MG/ML
INJECTION, SOLUTION INTRAMUSCULAR; INTRAVENOUS
Status: DISCONTINUED | OUTPATIENT
Start: 2018-11-13 | End: 2018-11-13

## 2018-11-13 RX ADMIN — ACETAMINOPHEN 1000 MG: 500 TABLET, FILM COATED ORAL at 09:11

## 2018-11-13 RX ADMIN — LIDOCAINE HYDROCHLORIDE 75 MG: 20 INJECTION, SOLUTION INTRAVENOUS at 11:11

## 2018-11-13 RX ADMIN — FENTANYL CITRATE 50 MCG: 50 INJECTION, SOLUTION INTRAMUSCULAR; INTRAVENOUS at 11:11

## 2018-11-13 RX ADMIN — METOPROLOL TARTRATE 1 MG: 5 INJECTION, SOLUTION INTRAVENOUS at 12:11

## 2018-11-13 RX ADMIN — ROCURONIUM BROMIDE 10 MG: 10 INJECTION, SOLUTION INTRAVENOUS at 12:11

## 2018-11-13 RX ADMIN — PROPOFOL 200 MCG/KG/MIN: 10 INJECTION, EMULSION INTRAVENOUS at 12:11

## 2018-11-13 RX ADMIN — ROCURONIUM BROMIDE 5 MG: 10 INJECTION, SOLUTION INTRAVENOUS at 12:11

## 2018-11-13 RX ADMIN — DEXAMETHASONE SODIUM PHOSPHATE 8 MG: 4 INJECTION, SOLUTION INTRAMUSCULAR; INTRAVENOUS at 09:11

## 2018-11-13 RX ADMIN — METOPROLOL TARTRATE 2 MG: 5 INJECTION, SOLUTION INTRAVENOUS at 12:11

## 2018-11-13 RX ADMIN — PROPOFOL 200 MG: 10 INJECTION, EMULSION INTRAVENOUS at 11:11

## 2018-11-13 RX ADMIN — MIDAZOLAM HYDROCHLORIDE 2 MG: 1 INJECTION, SOLUTION INTRAMUSCULAR; INTRAVENOUS at 11:11

## 2018-11-13 RX ADMIN — DEXAMETHASONE SODIUM PHOSPHATE 8 MG: 4 INJECTION, SOLUTION INTRAMUSCULAR; INTRAVENOUS at 11:11

## 2018-11-13 RX ADMIN — ROCURONIUM BROMIDE 35 MG: 10 INJECTION, SOLUTION INTRAVENOUS at 12:11

## 2018-11-13 RX ADMIN — SODIUM CHLORIDE, SODIUM GLUCONATE, SODIUM ACETATE, POTASSIUM CHLORIDE, MAGNESIUM CHLORIDE, SODIUM PHOSPHATE, DIBASIC, AND POTASSIUM PHOSPHATE: .53; .5; .37; .037; .03; .012; .00082 INJECTION, SOLUTION INTRAVENOUS at 12:11

## 2018-11-13 RX ADMIN — FENTANYL CITRATE 50 MCG: 50 INJECTION, SOLUTION INTRAMUSCULAR; INTRAVENOUS at 12:11

## 2018-11-13 RX ADMIN — KETAMINE HYDROCHLORIDE 10 MG: 10 INJECTION, SOLUTION INTRAMUSCULAR; INTRAVENOUS at 12:11

## 2018-11-13 RX ADMIN — LIDOCAINE HYDROCHLORIDE: 10 INJECTION, SOLUTION EPIDURAL; INFILTRATION; INTRACAUDAL; PERINEURAL at 10:11

## 2018-11-13 RX ADMIN — FAMOTIDINE 20 MG: 10 INJECTION, SOLUTION INTRAVENOUS at 11:11

## 2018-11-13 RX ADMIN — ONDANSETRON 4 MG: 2 INJECTION INTRAMUSCULAR; INTRAVENOUS at 11:11

## 2018-11-13 RX ADMIN — SUGAMMADEX 400 MG: 100 INJECTION, SOLUTION INTRAVENOUS at 12:11

## 2018-11-13 RX ADMIN — BISOPROLOL FUMARATE 10 MG: 5 TABLET ORAL at 04:11

## 2018-11-13 RX ADMIN — KETAMINE HYDROCHLORIDE 20 MG: 10 INJECTION, SOLUTION INTRAMUSCULAR; INTRAVENOUS at 11:11

## 2018-11-13 RX ADMIN — GLYCOPYRROLATE 0.1 MG: 0.2 INJECTION, SOLUTION INTRAMUSCULAR; INTRAVENOUS at 11:11

## 2018-11-13 RX ADMIN — SODIUM CHLORIDE: 0.9 INJECTION, SOLUTION INTRAVENOUS at 10:11

## 2018-11-13 RX ADMIN — IBUPROFEN 800 MG: 400 TABLET, FILM COATED ORAL at 04:11

## 2018-11-13 RX ADMIN — CEFAZOLIN 2 G: 330 INJECTION, POWDER, FOR SOLUTION INTRAMUSCULAR; INTRAVENOUS at 11:11

## 2018-11-13 NOTE — BRIEF OP NOTE
[facility]  Brief Operative Note    SUMMARY     Surgery Date: 11/13/2018     Surgeon(s) and Role:     * August Mendoza MD - Primary     * Kirt Velarde MD - Resident - Assisting    Pre-op Diagnosis:  Tracheal stenosis [J39.8]  Subglottic stenosis [J38.6]    Post-op Diagnosis:  Post-Op Diagnosis Codes:     * Tracheal stenosis [J39.8]     * Subglottic stenosis [J38.6]    Procedure(s) (LRB):  Suspension microlaryngoscopy, CO2 laser excision, steroid injection, balloon dilation.  JET VENTILATION. (N/A)    Anesthesia: General    Description of Procedure: Direct laryngoscopy/bronchoscopy with jet ventilation, CO2 laser incision of stenotic subglottic/tracheal segment, Kenalog injection,  Balloon dilation.    Description of the findings of the procedure: See op note for full details.    Estimated Blood Loss: * No values recorded between 11/13/2018 11:59 AM and 11/13/2018 12:43 PM *         Specimens:   Specimen (12h ago, onward)    None

## 2018-11-13 NOTE — TRANSFER OF CARE
"Anesthesia Transfer of Care Note    Patient: Denise Fry    Procedure(s) Performed: Procedure(s) (LRB):  Suspension microlaryngoscopy, CO2 laser excision, steroid injection, balloon dilation.  JET VENTILATION. (N/A)    Patient location: PACU    Anesthesia Type: general    Transport from OR: Transported from OR on 6-10 L/min O2 by face mask with adequate spontaneous ventilation    Post pain: adequate analgesia    Post assessment: no apparent anesthetic complications    Post vital signs: stable    Level of consciousness: sedated    Nausea/Vomiting: no nausea/vomiting    Complications: none    Transfer of care protocol was followed      Last vitals:   Visit Vitals  BP (!) 163/76 (BP Location: Right arm, Patient Position: Lying)   Pulse 73   Temp 36.8 °C (98.2 °F) (Oral)   Resp 17   Ht 5' 2" (1.575 m)   Wt 80.7 kg (178 lb)   LMP 09/13/2018 (Approximate)   SpO2 100%   Breastfeeding? No   BMI 32.56 kg/m²     "

## 2018-11-13 NOTE — ANESTHESIA POSTPROCEDURE EVALUATION
"Anesthesia Post Evaluation    Patient: Denise Fry    Procedure(s) Performed: Procedure(s) (LRB):  Suspension microlaryngoscopy, CO2 laser excision, steroid injection, balloon dilation.  JET VENTILATION. (N/A)    Final Anesthesia Type: general  Patient location during evaluation: PACU  Patient participation: Yes- Able to Participate  Level of consciousness: awake and alert and awake  Post-procedure vital signs: reviewed and stable  Pain management: adequate  Airway patency: patent  PONV status at discharge: No PONV  Anesthetic complications: no      Cardiovascular status: blood pressure returned to baseline  Respiratory status: unassisted and spontaneous ventilation  Hydration status: euvolemic  Follow-up not needed.        Visit Vitals  /74   Pulse 62   Temp 36.6 °C (97.9 °F) (Temporal)   Resp 11   Ht 5' 2" (1.575 m)   Wt 80.7 kg (178 lb)   LMP 09/13/2018 (Approximate)   SpO2 100%   Breastfeeding? No   BMI 32.56 kg/m²       Pain/Roger Score: Pain Assessment Performed: Yes (11/13/2018 12:45 PM)  Presence of Pain: complains of pain/discomfort (11/13/2018  1:15 PM)  Pain Rating Post Med Admin: 2 (11/13/2018  1:15 PM)  Roger Score: 7 (11/13/2018 12:45 PM)        "

## 2018-11-13 NOTE — NURSING TRANSFER
Nursing Transfer Note      11/13/2018     Transfer To: 1043    Transfer via stretcher    Transfer with RA    Transported by pct    Medicines sent: none    Chart send with patient: Yes    Notified: spouse    Patient reassessed at: 11/13/18 see flowsheets

## 2018-11-13 NOTE — INTERVAL H&P NOTE
The patient has been examined and the H&P has been reviewed:    I concur with the findings and no changes have occurred since H&P was written.    Anesthesia/Surgery risks, benefits and alternative options discussed and understood by patient/family.          Active Hospital Problems    Diagnosis  POA    Tracheal stenosis [J39.8]  Yes      Resolved Hospital Problems   No resolved problems to display.

## 2018-11-13 NOTE — OP NOTE
DATE OF PROCEDURE:  11/13/2018    PREOPERATIVE DIAGNOSIS:  Idiopathic laryngotracheal stenosis.    POSTOPERATIVE DIAGNOSIS:  Idiopathic laryngotracheal stenosis.    PROCEDURES PERFORMED:  1.  Suspension microlaryngoscopy with rigid tracheobronchoscopy.  2.  CO2 laser radial incision of laryngotracheal stenosis.  3.  Balloon dilation to 12 mm.  4.  Intralesional injection of Kenalog 40, 0.8 mL.    SURGEON:  August Mendoza M.D.    ASSISTANT:  Kirt Velarde M.D. (RES)    ANESTHESIA:  General, jet ventilation.    COMPLICATIONS:  None.    SPECIMENS:  None.    BLOOD LOSS:  Less than 5 mL.    FINDINGS:  1.  The patient is a straightforward exposure using an Ossoff-Pilling   subglottiscope.  2.  Corkscrewing stenosis is present, beginning in the posterior glottis,   tracking through the subglottis into the proximal trachea, with the airway lumen   narrowest at its most distal end, where it was estimated at approximately 5 mm.    The lumen is primarily right-sided.  The total length of the stenotic segment is   estimated at 2 cm.    INDICATIONS FOR PROCEDURE:  The patient is a 48-year-old lady with idiopathic   laryngotracheal stenosis, primarily centered in the subglottis and trachea,   though with some encroachment into the posterior glottis.  She has undergone   multiple prior endoscopic procedures in [place], the last of which was in December 2016.  She has been experiencing gradual worsening of her symptoms over the last   several months.  She presents today for endoscopic airway intervention.  Prior   to procedure, the risks, benefits and options were discussed at length with the   patient.  Risks included but were not limited to pain, bleeding, infection,   scar, recurrence, worsening of voice, worsening of swallowing, aspiration,   damage to the lips, teeth, gums or tongue, tongue numbness, taste disturbance,   laser injury including vision loss and airway fire, risks of general anesthesia,   pneumomediastinum,  pneumothorax, laryngotracheal rupture, pharyngoesophageal or   tracheoesophageal leak, fistula.  In spite of the risks of surgery, the patient   desired to move forward with the procedure.  Informed consent was obtained.    PROCEDURE IN DETAIL:  The patient was positively identified in the preoperative   holding area.  She was brought to the Operating Room and was placed in a flat   supine position.  Final timeout was performed for verification purposes.  IV   anesthesia induction was administered.  After confirmation of adequate mask   ventilation, IV muscle relaxation was administered.  Mask ventilation continued.    The eyes were protected with moist sponges.  The upper dentition was protected   with a reinforced mouthguard.  The Ossoff-Pilling subglottiscope was lubricated   and was inserted into the oral cavity via the right lingual gutter.  It was   utilized to first inspect the endolarynx and then to expose the subglottis.  The airway was   topically anesthetized with 3 mL of 4% lidocaine.  The patient was suspended   from the Farris  the usual fashion.  Jet ventilation was initiated via the   subglottiscope.  Adequate chest rise and fall were noted.  Over the course of   the case driving pressure varied from 25 to 35 and the frequency of ventilation   varied from 20 to 50.  Next, magnified laryngotracheal bronchoscopy was   performed with the 0-degree Muse richard telescope connected to a video monitor.    Findings were as noted above.  Once beyond the approximately 2 cm stenotic   segment, the remainder of the trachea and proximal mainstem bronchi were noted   to be normal.  Next, attention was turned to the CO2 laser relief of stenosis.    The fraction of inspired oxygen was maintained at less than 30% during all   portions of laser use.  All Operating Room personnel were equipped with   laser-safe eyewear.  All exposed regions of the patient's skin were protected   with moist towels.  Under the  maximal magnification of the operating microscope,   the CO2 laser, with settings of 4 zamora continuous initially, but later 6 zamora   continuous, was used to perform relaxing incisions at the 8 o'clock, 10 o'clock,   and 4 o'clock positions.  Jet ventilation was resumed and the laser was   deactivated while the patient was again ventilated with 100% FIO2. A solution of   Kenalog 40 was loaded into the laryngotracheal injector needle and was used to inject   the stenotic segment in a circumferential fashion.  This was carried up into the subglottis and   posterior glottis, with total volume administered 0.8 mL.  Thereafter, attention   was turned to balloon dilation.   The laryngoscope   was repositioned at the glottic level and preparation was made for   steroid injection.  The 12 mm radial dilation balloon catheter was advanced   into the subglottis during a brief period of apnea.  It was inflated to its   maximal working pressure of 10 atmospheres for duration of 30 seconds.    Thereafter, the balloon was deflated and was removed.  The laryngoscope was   again positioned into the subglottis.  Jet ventilation continued.  There was   marked relief in the extent of airway obstruction and marked improvement in the   ease of ventilation.  The airway was suctioned with the rigid bronchoscopic   suction.  Magnified tracheobronchoscopy was once more performed with the 0 degree telescope.    This demonstrated remarkable improvement in the caliber of the airway.  The walls of   the stenotic segment were nearly flush with the walls of the native airway   lumen.  No further intervention was performed.  Jet ventilation ceased.  All   equipment was removed from the mouth.  The patient was transitioned back to mask   ventilation and was thereafter handed back over to the Anesthesiology team for   emergence from anesthesia, which was uneventful.  Thereafter, the patient was   escorted to the Recovery Room in good condition.  The  patient tolerated the   procedure well.  There were no complications.  All needle, sponge and instrument   counts were correct at the end of the case.    ATTESTATION:  I, as the attending of record, was present and participated in all   portions of this procedure.        /leonel [id number] blank(s)        CYNDY/KAR  dd: 11/13/2018 12:57:34 (CST)  td: 11/13/2018 15:32:55 (CST)  Doc ID   #[id number]  Job ID #[id number]    CC:     [id number]

## 2018-11-14 VITALS
DIASTOLIC BLOOD PRESSURE: 66 MMHG | TEMPERATURE: 98 F | WEIGHT: 178 LBS | HEART RATE: 61 BPM | HEIGHT: 62 IN | RESPIRATION RATE: 18 BRPM | OXYGEN SATURATION: 100 % | BODY MASS INDEX: 32.76 KG/M2 | SYSTOLIC BLOOD PRESSURE: 130 MMHG

## 2018-11-14 PROCEDURE — 63600175 PHARM REV CODE 636 W HCPCS: Performed by: STUDENT IN AN ORGANIZED HEALTH CARE EDUCATION/TRAINING PROGRAM

## 2018-11-14 PROCEDURE — 25000003 PHARM REV CODE 250: Performed by: STUDENT IN AN ORGANIZED HEALTH CARE EDUCATION/TRAINING PROGRAM

## 2018-11-14 RX ORDER — PREDNISONE 10 MG/1
TABLET ORAL
Qty: 28 TABLET | Refills: 0 | Status: SHIPPED | OUTPATIENT
Start: 2018-11-14 | End: 2018-11-27

## 2018-11-14 RX ADMIN — BISOPROLOL FUMARATE 10 MG: 5 TABLET ORAL at 08:11

## 2018-11-14 RX ADMIN — IBUPROFEN 800 MG: 400 TABLET, FILM COATED ORAL at 05:11

## 2018-11-14 RX ADMIN — PANTOPRAZOLE SODIUM 40 MG: 40 TABLET, DELAYED RELEASE ORAL at 08:11

## 2018-11-14 RX ADMIN — ZOLPIDEM TARTRATE 5 MG: 5 TABLET ORAL at 12:11

## 2018-11-14 RX ADMIN — DEXAMETHASONE SODIUM PHOSPHATE 8 MG: 4 INJECTION, SOLUTION INTRAMUSCULAR; INTRAVENOUS at 05:11

## 2018-11-14 NOTE — SUBJECTIVE & OBJECTIVE
Interval History: No acute events overnight.  Tolerated diet without dysphagia or odynophagia.  Denies difficulty breathing this AM.    Medications:  Continuous Infusions:  Scheduled Meds:   bisoprolol  10 mg Oral Daily    dexamethasone  8 mg Intravenous Q8H    pantoprazole  40 mg Oral Daily     PRN Meds:acetaminophen, hydrALAZINE, ibuprofen, ondansetron, sodium chloride 0.9%, zolpidem     Review of patient's allergies indicates:   Allergen Reactions    Codeine      Objective:     Vital Signs (24h Range):  Temp:  [97.8 °F (36.6 °C)-98.9 °F (37.2 °C)] 97.8 °F (36.6 °C)  Pulse:  [59-73] 61  Resp:  [10-18] 18  SpO2:  [95 %-100 %] 95 %  BP: (120-163)/(60-77) 148/70        Lines/Drains/Airways     Peripheral Intravenous Line                 Peripheral IV - Single Lumen 11/14/18 0530 Right Hand less than 1 day              Physical Exam  Awake, alert, pleasant  Neck soft and without edema or crepitus  No stridor  Voice at baseline  No increased work of breathing    Significant Labs:  CBC: No results for input(s): WBC, RBC, HGB, HCT, PLT, MCV, MCH, MCHC in the last 168 hours.    Significant Diagnostics:  X-Ray: I have reviewed all pertinent results/findings within the past 24 hours and my personal findings are:  no pneumothorax

## 2018-11-14 NOTE — NURSING
Patient d/c to home today accompanied by . D/C teaching performed with patient. Follow up appointments confirmed with patient. IV d/c prior to discharge, no bleeding, redness, swelling noted, site dressing CDI. Medication teaching performed, med list confirmed. Instructed ond/c plan including s/s which warrant calling MD such as temp greater than 100.4, headache, new onset and/or severe pain, swelling/ redness at incision sites. [facility] in [facility]/c instructions. Family questions answered. Patient verbalized understanding.

## 2018-11-14 NOTE — PLAN OF CARE
Problem: Patient Care Overview  Goal: Plan of Care Review  Outcome: Ongoing (interventions implemented as appropriate)  Pt AAOx4 medicated x2 during shift for pain management with positive results call light within reach safety measures in place IV was changed from left hand d/t infiltration to right hand 22 gauge flushed patent tpt tolerated well possible d/c today

## 2018-11-14 NOTE — ASSESSMENT & PLAN NOTE
POD 1 direct laryngoscopy tracheoscopy with jet ventilation, CO2 laser incision of stenotic segment, kenalog injection, and tracheal balloon dilation.    - Doing well  - Discharge today with prednisone taper.  30mg daily x 1 week, then 20mg daily x 2 days, then 10mg daily x 2 days, then 5mg daily x 2 days, then stop.  - Return to clinic 1-2 weeks to see Dr. Mendoza

## 2018-11-15 NOTE — DISCHARGE SUMMARY
[facility]  Otorhinolaryngology-Head & Neck Surgery  Discharge Summary      Patient Name: Denise Fry  MRN: [id number]  Admission Date: 11/13/2018  Hospital Length of Stay: 0 days  Discharge Date and Time:  11/15/2018 12:42 PM  Attending Physician: No att. providers found   Discharging Provider: Kirt Velarde MD  Primary Care Provider: Primary Doctor No     HPI: Patient with subglottic and tracheal stenosis admitted for surgery as below on 11/14/18.  Tolerated surgery without complication.  Patient was observed overnight without acute events and stable for discharge on POD 1    Procedure(s) (LRB):  Suspension microlaryngoscopy, CO2 laser excision, steroid injection, balloon dilation.  JET VENTILATION. (N/A)     Hospital Course: Patient with subglottic and tracheal stenosis admitted for surgery as below on 11/14/18.  Tolerated surgery without complication.  Patient was observed overnight without acute events and stable for discharge on POD 1    Significant Diagnostic Studies:   Specimen (12h ago, onward)    None          Pending Diagnostic Studies:     None        Final Active Diagnoses:    Diagnosis Date Noted POA    PRINCIPAL PROBLEM:  Tracheal stenosis [J39.8] 10/15/2018 Yes      Problems Resolved During this Admission:      Discharged Condition: stable    Disposition: Home or Self Care    Follow Up:  Follow-up Information     August Mendoza MD In 2 weeks.    Specialty:  Otolaryngology  Why:  For post-op visit  Contact information:  [address] [facility] [address]  [phone]                 Patient Instructions:      Diet Adult Regular     Notify your health care provider if you experience any of the following:  temperature >100.4     Notify your health care provider if you experience any of the following:  severe uncontrolled pain     Notify your health care provider if you experience any of the following:  redness, tenderness, or signs of infection (pain, swelling,  redness, odor or green/yellow discharge around incision site)     Notify your health care provider if you experience any of the following:  difficulty breathing or increased cough     No dressing needed     Activity as tolerated     Medications:  Reconciled Home Medications:      Medication List      START taking these medications    predniSONE 10 MG tablet  Commonly known as:  DELTASONE  Take 3 tablets (30 mg total) by mouth once daily for 7 days, THEN 2 tablets (20 mg total) once daily for 2 days, THEN 1 tablet (10 mg total) once daily for 2 days, THEN 0.5 tablets (5 mg total) once daily for 2 days.  Start taking on:  11/14/2018        CONTINUE taking these medications    bisoprolol-hydrochlorothiazide 10-6.25 mg per tablet  Commonly known as:  ZIAC  Take 1 tablet by mouth once daily.     fexofenadine-pseudoephedrine 180-240 mg per 24 hr tablet  Commonly known as:  ALLEGRA-D 24  Take 1 tablet by mouth once daily.     lansoprazole 30 MG capsule  Commonly known as:  PREVACID  Take 30 mg by mouth once daily.     valACYclovir 500 MG tablet  Commonly known as:  VALTREX  Take 500 mg by mouth 2 (two) times daily.     zolpidem 10 mg Tab  Commonly known as:  AMBIEN  Take 5 mg by mouth nightly as needed.            Kirt Velarde MD  Otorhinolaryngology-Head & Neck Surgery  [facility]

## 2018-11-26 NOTE — PROGRESS NOTES
[facility]  Department of Otorhinolaryngology and Communication Sciences    Subjective:      Denise Fry is a 48 y.o. female who presents for follow-up. She has idiopathic laryngotracheal stenosis.    TREATMENT HISTORY:  11/13/2018 - SML/TB; CO2 laser; dilation 12 mm; kenalog    Since surgery, she has been doing much better. No difficulty or limitation with breathing. Has gotten much of her energy back. Voice is better. Still with postnasal drip. Pleased with her progress.     Has had a cold for a few days.    The patient's medications, allergies, past medical, surgical, social and family histories were reviewed and updated as appropriate.    A detailed review of systems was obtained with pertinent positives as per the above HPI, and otherwise negative.      Objective:     BP (!) 157/87   Pulse 74   Temp 98.4 °F (36.9 °C)   Wt 82.6 kg (182 lb 1.6 oz)   BMI 33.31 kg/m²      Constitutional: comfortable, well dressed  Psychiatric: appropriate affect  Respiratory: comfortably breathing, symmetric chest rise, very faint coarse biphasic stridor (inspiration>expiration)  Voice: normal for age and gender  Head: normocephalic  Eyes: conjunctivae and sclerae clear  Indirect laryngoscopy is limited due to gag    Procedure  Flexible Laryngoscopy (59670): Laryngoscopy is indicated for assessment of upper aerodigestive structure and function. This was carried out transnasally with a distal chip videoendoscope. After verbal consent was obtained, the patient was positioned and the nose was topically decongested with 1% phenylephrine and topically anesthetized with 4% lidocaine. The endoscope was passed through the most patent nasal cavity and positioned to image the nasopharynx, larynx, and hypopharynx in detail. The following features were examined: nasopharyngeal, laryngeal, hypopharyngeal masses; velopharyngeal strength, closure, and symmetry of motion; vocal fold range and symmetry of motion; laryngeal mucosal  edema, erythema, inflammation, and hydration; salivary pooling; and gross laryngeal sensation. The equipment was removed. The patient tolerated the procedure well without complication. All findings were normal except:  - circumferential stenosis beginning at posterior glottis, tracking through subglottis into proximal trachea;   - interval improvement in patency, though with persistent contracture of airway walls  - right vocal fold with slightly limited lateral excursion and slightly elongated      Assessment:     Denise Fry is a 48 y.o. female with idiopathic laryngotracheal stenosis. She has made good progress following recent surgical intervention.     Plan:     Reassurance was provided. I did speak with her about adjunct serial office steroid injection. She will think this over and discuss this with her .     She will follow up with me in about 3 months, or sooner if needed.    All questions were answered, and the patient is in agreement with the plan.    August Mendoza M.D.  [facility]  Department of Otorhinolaryngology and Communication Sciences

## 2018-11-30 ENCOUNTER — OFFICE VISIT (OUTPATIENT)
Dept: OTOLARYNGOLOGY | Facility: CLINIC | Age: 48
End: 2018-11-30
Payer: COMMERCIAL

## 2018-11-30 VITALS
BODY MASS INDEX: 33.31 KG/M2 | WEIGHT: 182.13 LBS | TEMPERATURE: 98 F | DIASTOLIC BLOOD PRESSURE: 87 MMHG | HEART RATE: 74 BPM | SYSTOLIC BLOOD PRESSURE: 157 MMHG

## 2018-11-30 DIAGNOSIS — J39.8 TRACHEAL STENOSIS: Primary | ICD-10-CM

## 2018-11-30 DIAGNOSIS — J38.6 SUBGLOTTIC STENOSIS: ICD-10-CM

## 2018-11-30 PROCEDURE — 31575 DIAGNOSTIC LARYNGOSCOPY: CPT | Mod: S$GLB,,, | Performed by: OTOLARYNGOLOGY

## 2018-11-30 PROCEDURE — 3008F BODY MASS INDEX DOCD: CPT | Mod: CPTII,S$GLB,, | Performed by: OTOLARYNGOLOGY

## 2018-11-30 PROCEDURE — 99999 PR PBB SHADOW E&M-EST. PATIENT-LVL III: CPT | Mod: PBBFAC,,, | Performed by: OTOLARYNGOLOGY

## 2018-11-30 PROCEDURE — 99213 OFFICE O/P EST LOW 20 MIN: CPT | Mod: 25,S$GLB,, | Performed by: OTOLARYNGOLOGY

## 2019-01-03 ENCOUNTER — LAB VISIT (OUTPATIENT)
Dept: LAB | Facility: HOSPITAL | Age: 49
End: 2019-01-03
Attending: FAMILY MEDICINE
Payer: COMMERCIAL

## 2019-01-03 ENCOUNTER — OFFICE VISIT (OUTPATIENT)
Dept: FAMILY MEDICINE | Facility: CLINIC | Age: 49
End: 2019-01-03
Payer: COMMERCIAL

## 2019-01-03 VITALS
DIASTOLIC BLOOD PRESSURE: 79 MMHG | WEIGHT: 183.44 LBS | HEART RATE: 67 BPM | HEIGHT: 62 IN | BODY MASS INDEX: 33.76 KG/M2 | SYSTOLIC BLOOD PRESSURE: 129 MMHG

## 2019-01-03 DIAGNOSIS — I10 ESSENTIAL HYPERTENSION: ICD-10-CM

## 2019-01-03 DIAGNOSIS — I10 ESSENTIAL HYPERTENSION: Primary | ICD-10-CM

## 2019-01-03 DIAGNOSIS — K21.9 GASTROESOPHAGEAL REFLUX DISEASE WITHOUT ESOPHAGITIS: ICD-10-CM

## 2019-01-03 DIAGNOSIS — F51.01 PRIMARY INSOMNIA: ICD-10-CM

## 2019-01-03 DIAGNOSIS — B00.1 FEVER BLISTER: ICD-10-CM

## 2019-01-03 DIAGNOSIS — J39.8 TRACHEAL STENOSIS: ICD-10-CM

## 2019-01-03 LAB
ALBUMIN SERPL BCP-MCNC: 3.8 G/DL
ALP SERPL-CCNC: 74 U/L
ALT SERPL W/O P-5'-P-CCNC: 26 U/L
ANION GAP SERPL CALC-SCNC: 9 MMOL/L
AST SERPL-CCNC: 24 U/L
BILIRUB SERPL-MCNC: 1 MG/DL
BUN SERPL-MCNC: 13 MG/DL
CALCIUM SERPL-MCNC: 10 MG/DL
CHLORIDE SERPL-SCNC: 105 MMOL/L
CHOLEST SERPL-MCNC: 201 MG/DL
CHOLEST/HDLC SERPL: 2.8 {RATIO}
CO2 SERPL-SCNC: 25 MMOL/L
CREAT SERPL-MCNC: 0.8 MG/DL
EST. GFR  (AFRICAN AMERICAN): >60 ML/MIN/1.73 M^2
EST. GFR  (NON AFRICAN AMERICAN): >60 ML/MIN/1.73 M^2
GLUCOSE SERPL-MCNC: 100 MG/DL
HDLC SERPL-MCNC: 73 MG/DL
HDLC SERPL: 36.3 %
LDLC SERPL CALC-MCNC: 108.6 MG/DL
NONHDLC SERPL-MCNC: 128 MG/DL
POTASSIUM SERPL-SCNC: 3.9 MMOL/L
PROT SERPL-MCNC: 7.3 G/DL
SODIUM SERPL-SCNC: 139 MMOL/L
TRIGL SERPL-MCNC: 97 MG/DL

## 2019-01-03 PROCEDURE — 99999 PR PBB SHADOW E&M-EST. PATIENT-LVL III: ICD-10-PCS | Mod: PBBFAC,,, | Performed by: FAMILY MEDICINE

## 2019-01-03 PROCEDURE — 3078F PR MOST RECENT DIASTOLIC BLOOD PRESSURE < 80 MM HG: ICD-10-PCS | Mod: CPTII,S$GLB,, | Performed by: FAMILY MEDICINE

## 2019-01-03 PROCEDURE — 3008F BODY MASS INDEX DOCD: CPT | Mod: CPTII,S$GLB,, | Performed by: FAMILY MEDICINE

## 2019-01-03 PROCEDURE — 99999 PR PBB SHADOW E&M-EST. PATIENT-LVL III: CPT | Mod: PBBFAC,,, | Performed by: FAMILY MEDICINE

## 2019-01-03 PROCEDURE — 3074F PR MOST RECENT SYSTOLIC BLOOD PRESSURE < 130 MM HG: ICD-10-PCS | Mod: CPTII,S$GLB,, | Performed by: FAMILY MEDICINE

## 2019-01-03 PROCEDURE — 99204 OFFICE O/P NEW MOD 45 MIN: CPT | Mod: S$GLB,,, | Performed by: FAMILY MEDICINE

## 2019-01-03 PROCEDURE — 3078F DIAST BP <80 MM HG: CPT | Mod: CPTII,S$GLB,, | Performed by: FAMILY MEDICINE

## 2019-01-03 PROCEDURE — 36415 COLL VENOUS BLD VENIPUNCTURE: CPT | Mod: PO

## 2019-01-03 PROCEDURE — 3074F SYST BP LT 130 MM HG: CPT | Mod: CPTII,S$GLB,, | Performed by: FAMILY MEDICINE

## 2019-01-03 PROCEDURE — 80053 COMPREHEN METABOLIC PANEL: CPT

## 2019-01-03 PROCEDURE — 3008F PR BODY MASS INDEX (BMI) DOCUMENTED: ICD-10-PCS | Mod: CPTII,S$GLB,, | Performed by: FAMILY MEDICINE

## 2019-01-03 PROCEDURE — 99204 PR OFFICE/OUTPT VISIT, NEW, LEVL IV, 45-59 MIN: ICD-10-PCS | Mod: S$GLB,,, | Performed by: FAMILY MEDICINE

## 2019-01-03 PROCEDURE — 80061 LIPID PANEL: CPT

## 2019-01-03 RX ORDER — OMEPRAZOLE 40 MG/1
CAPSULE, DELAYED RELEASE ORAL
Refills: 0 | COMMUNITY
Start: 2018-12-03 | End: 2019-01-03 | Stop reason: SDUPTHER

## 2019-01-03 RX ORDER — OMEPRAZOLE 40 MG/1
CAPSULE, DELAYED RELEASE ORAL
Qty: 90 CAPSULE | Refills: 1 | Status: SHIPPED | OUTPATIENT
Start: 2019-01-03 | End: 2019-06-09 | Stop reason: SDUPTHER

## 2019-01-03 RX ORDER — ZOLPIDEM TARTRATE 10 MG/1
10 TABLET ORAL NIGHTLY PRN
Qty: 90 TABLET | Refills: 1 | Status: SHIPPED | OUTPATIENT
Start: 2019-01-03 | End: 2019-06-17 | Stop reason: SDUPTHER

## 2019-01-03 RX ORDER — VALACYCLOVIR HYDROCHLORIDE 500 MG/1
500 TABLET, FILM COATED ORAL 2 TIMES DAILY
Qty: 90 TABLET | Refills: 1 | Status: SHIPPED | OUTPATIENT
Start: 2019-01-03 | End: 2019-08-28 | Stop reason: SDUPTHER

## 2019-01-03 RX ORDER — BISOPROLOL FUMARATE AND HYDROCHLOROTHIAZIDE 10; 6.25 MG/1; MG/1
1 TABLET ORAL DAILY
Qty: 90 TABLET | Refills: 1 | Status: SHIPPED | OUTPATIENT
Start: 2019-01-03 | End: 2019-06-09 | Stop reason: SDUPTHER

## 2019-01-03 NOTE — PROGRESS NOTES
"Subjective:       Patient ID: Denise Fry is a 48 y.o. female.    Chief Complaint: Establish Care    This pt is new to me and to this clinic.  The pt is followed for HTN, GERD, insomnia.  She is followed by ENT (Dr. Mendoza) for tracheal stenosis.  The pt is concerned about her weight--she has joined [employer].      Review of Systems   Constitutional: Negative for activity change, appetite change, fatigue and unexpected weight change.   Eyes: Negative for visual disturbance.   Respiratory: Negative for cough, chest tightness and shortness of breath.    Cardiovascular: Negative for chest pain, palpitations and leg swelling.   Gastrointestinal: Negative for abdominal pain, constipation, diarrhea, nausea and vomiting.   Endocrine: Negative for cold intolerance, heat intolerance and polyuria.   Genitourinary: Negative for decreased urine volume and dysuria.   Musculoskeletal: Negative for arthralgias and back pain.   Skin: Negative for rash.   Neurological: Negative for numbness and headaches.       Objective:       Vitals:    01/03/19 1100   BP: 129/79   BP Location: Left arm   Patient Position: Sitting   BP Method: Medium (Manual)   Pulse: 67   Weight: 83.2 kg (183 lb 6.8 oz)   Height: 5' 2" (1.575 m)     Physical Exam   Constitutional: She is oriented to person, place, and time. She appears well-developed and well-nourished.   HENT:   Head: Normocephalic.   Eyes: Conjunctivae and EOM are normal. Pupils are equal, round, and reactive to light.   Neck: Normal range of motion. Neck supple. No thyromegaly present.   Cardiovascular: Normal rate, regular rhythm and normal heart sounds.   Pulmonary/Chest: Effort normal and breath sounds normal.   Abdominal: Soft. Bowel sounds are normal. There is no tenderness.   Musculoskeletal: Normal range of motion. She exhibits no tenderness or deformity.   Lymphadenopathy:     She has no cervical adenopathy.   Neurological: She is alert and oriented to person, place, and " time. She displays normal reflexes. No cranial nerve deficit. She exhibits normal muscle tone. Coordination normal.   Skin: Skin is warm and dry.   Psychiatric: She has a normal mood and affect. Her behavior is normal.       Assessment:       1. Essential hypertension    2. Gastroesophageal reflux disease without esophagitis    3. Tracheal stenosis    4. Primary insomnia    5. Fever blister        Plan:       Denise was seen today for establish care.    Diagnoses and all orders for this visit:    Essential hypertension  -     bisoprolol-hydrochlorothiazide (ZIAC) 10-6.25 mg per tablet; Take 1 tablet by mouth once daily.  -     Comprehensive metabolic panel; Future  -     Lipid panel; Future    Gastroesophageal reflux disease without esophagitis  -     omeprazole (PRILOSEC) 40 MG capsule; TK 1 C PO QD    Tracheal stenosis    Primary insomnia  -     zolpidem (AMBIEN) 10 mg Tab; Take 1 tablet (10 mg total) by mouth nightly as needed.    Fever blister  -     valACYclovir (VALTREX) 500 MG tablet; Take 1 tablet (500 mg total) by mouth 2 (two) times daily.      During this visit, I reviewed the pt's history, medications, allergies, and problem list.

## 2019-01-26 ENCOUNTER — PATIENT MESSAGE (OUTPATIENT)
Dept: FAMILY MEDICINE | Facility: CLINIC | Age: 49
End: 2019-01-26

## 2019-03-06 ENCOUNTER — OFFICE VISIT (OUTPATIENT)
Dept: OTOLARYNGOLOGY | Facility: CLINIC | Age: 49
End: 2019-03-06
Payer: COMMERCIAL

## 2019-03-06 ENCOUNTER — TELEPHONE (OUTPATIENT)
Dept: OTOLARYNGOLOGY | Facility: CLINIC | Age: 49
End: 2019-03-06

## 2019-03-06 VITALS
HEART RATE: 72 BPM | WEIGHT: 185.63 LBS | BODY MASS INDEX: 33.95 KG/M2 | SYSTOLIC BLOOD PRESSURE: 168 MMHG | DIASTOLIC BLOOD PRESSURE: 80 MMHG

## 2019-03-06 DIAGNOSIS — J38.6 SUBGLOTTIC STENOSIS: Primary | ICD-10-CM

## 2019-03-06 DIAGNOSIS — J39.8 TRACHEAL STENOSIS: ICD-10-CM

## 2019-03-06 PROCEDURE — 99999 PR PBB SHADOW E&M-EST. PATIENT-LVL III: ICD-10-PCS | Mod: PBBFAC,,, | Performed by: OTOLARYNGOLOGY

## 2019-03-06 PROCEDURE — 3079F PR MOST RECENT DIASTOLIC BLOOD PRESSURE 80-89 MM HG: ICD-10-PCS | Mod: CPTII,S$GLB,, | Performed by: OTOLARYNGOLOGY

## 2019-03-06 PROCEDURE — 3077F SYST BP >= 140 MM HG: CPT | Mod: CPTII,S$GLB,, | Performed by: OTOLARYNGOLOGY

## 2019-03-06 PROCEDURE — 3008F PR BODY MASS INDEX (BMI) DOCUMENTED: ICD-10-PCS | Mod: CPTII,S$GLB,, | Performed by: OTOLARYNGOLOGY

## 2019-03-06 PROCEDURE — 3077F PR MOST RECENT SYSTOLIC BLOOD PRESSURE >= 140 MM HG: ICD-10-PCS | Mod: CPTII,S$GLB,, | Performed by: OTOLARYNGOLOGY

## 2019-03-06 PROCEDURE — 99999 PR PBB SHADOW E&M-EST. PATIENT-LVL III: CPT | Mod: PBBFAC,,, | Performed by: OTOLARYNGOLOGY

## 2019-03-06 PROCEDURE — 3079F DIAST BP 80-89 MM HG: CPT | Mod: CPTII,S$GLB,, | Performed by: OTOLARYNGOLOGY

## 2019-03-06 PROCEDURE — 31575 DIAGNOSTIC LARYNGOSCOPY: CPT | Mod: S$GLB,,, | Performed by: OTOLARYNGOLOGY

## 2019-03-06 PROCEDURE — 31575 PR LARYNGOSCOPY, FLEXIBLE; DIAGNOSTIC: ICD-10-PCS | Mod: S$GLB,,, | Performed by: OTOLARYNGOLOGY

## 2019-03-06 PROCEDURE — 3008F BODY MASS INDEX DOCD: CPT | Mod: CPTII,S$GLB,, | Performed by: OTOLARYNGOLOGY

## 2019-03-06 PROCEDURE — 99213 PR OFFICE/OUTPT VISIT, EST, LEVL III, 20-29 MIN: ICD-10-PCS | Mod: 25,S$GLB,, | Performed by: OTOLARYNGOLOGY

## 2019-03-06 PROCEDURE — 99213 OFFICE O/P EST LOW 20 MIN: CPT | Mod: 25,S$GLB,, | Performed by: OTOLARYNGOLOGY

## 2019-03-06 NOTE — TELEPHONE ENCOUNTER
----- Message from Loraine Mead sent at 3/6/2019  2:32 PM CST -----  Contact: Patient   Needs Advice    Reason for call: Patient would like to speak to nurse about rescheduling appt to sooner date if possible.         Communication Preference: [phone]

## 2019-03-06 NOTE — PROGRESS NOTES
[facility]  Department of Otorhinolaryngology and Communication Sciences    Subjective:      Denise Fry is a 48 y.o. female who presents for follow-up. She has idiopathic laryngotracheal stenosis.    TREATMENT HISTORY:  11/13/2018 - SML/TB; CO2 laser; dilation 12 mm; kenalog    Continues to do very well. No difficulty or limitation with breathing, either at rest or with exertion. She has been working out--does great on the treadmill or on the bike. Voice is good. Remains pleased with her progress. Does endorse some anxiety related to her airway issue.    Had an allergist in [place]. During peak season, she gets more nasal drainage, which she think exacerbates her airway problem.     The patient's medications, allergies, past medical, surgical, social and family histories were reviewed and updated as appropriate.    A detailed review of systems was obtained with pertinent positives as per the above HPI, and otherwise negative.      Objective:     BP (!) 168/80   Pulse 72   Wt 84.2 kg (185 lb 10 oz)   BMI 33.95 kg/m²      Constitutional: comfortable, well dressed  Psychiatric: appropriate affect  Respiratory: comfortably breathing, symmetric chest rise, very faint coarse biphasic stridor (inspiration>expiration)   Voice: normal for age and gender  Head: normocephalic  Eyes: conjunctivae and sclerae clear  Indirect laryngoscopy is limited due to gag    Procedure  Flexible Laryngoscopy (73928): Laryngoscopy is indicated for assessment of upper aerodigestive structure and function. This was carried out transnasally with a distal chip videoendoscope. After verbal consent was obtained, the patient was positioned and the nose was topically decongested with 1% phenylephrine and topically anesthetized with 4% lidocaine. The endoscope was passed through the most patent nasal cavity and positioned to image the nasopharynx, larynx, and hypopharynx in detail. The following features were examined:  nasopharyngeal, laryngeal, hypopharyngeal masses; velopharyngeal strength, closure, and symmetry of motion; vocal fold range and symmetry of motion; laryngeal mucosal edema, erythema, inflammation, and hydration; salivary pooling; and gross laryngeal sensation. The equipment was removed. The patient tolerated the procedure well without complication. All findings were normal except:  - circumferential stenosis beginning at posterior glottis, tracking through subglottis into proximal trachea;    - durable improvements in patency, though with persistent contracture of airway walls  - right vocal fold with slightly limited lateral excursion and slightly elongated      Assessment:     Denise Fry is a 48 y.o. female with idiopathic laryngotracheal stenosis.     Plan:     Reassurance was provided. Further treatment at this time could entail either proceeding to the OR for endoscopic surgery/dilation, or initiating a series of office-based steroid injections.     The risks and benefits of intralesional steroid injection were discussed with the patient. Risks included but were not limited to bleeding, infection, allergic reaction to medication, scarring, worsening of voice, need for additional procedures, pain, epistaxis, and airway edema which could necessitate need for intubation or tracheostomy.     All questions were answered and the patient would like to proceed.  We will arrange this in the coming weeks.    August Mendoza M.D.  [facility]  Department of Otorhinolaryngology and Communication Sciences

## 2019-03-08 ENCOUNTER — TELEPHONE (OUTPATIENT)
Dept: OTOLARYNGOLOGY | Facility: CLINIC | Age: 49
End: 2019-03-08

## 2019-03-08 NOTE — TELEPHONE ENCOUNTER
----- Message from Rachel Womack sent at 3/8/2019  8:50 AM CST -----  Contact: pt   Needs Advice    Reason for call: pt is calling to speak with the nurse to reschedule her procedure appt for today pt said her insurance won't cover it its under review pt said it can take up to two weeks.         Communication Preference:can you please call pt at [phone]    Additional Information: none    CRISTOPHER

## 2019-03-14 ENCOUNTER — TELEPHONE (OUTPATIENT)
Dept: OTOLARYNGOLOGY | Facility: CLINIC | Age: 49
End: 2019-03-14

## 2019-03-14 NOTE — TELEPHONE ENCOUNTER
----- Message from Paula Kenny sent at 3/14/2019  9:56 AM CDT -----  Contact: patient  Please call above patient at [phone] need to speak with the nurse about appointments thanks

## 2019-04-12 ENCOUNTER — PROCEDURE VISIT (OUTPATIENT)
Dept: OTOLARYNGOLOGY | Facility: CLINIC | Age: 49
End: 2019-04-12
Payer: COMMERCIAL

## 2019-04-12 VITALS
DIASTOLIC BLOOD PRESSURE: 89 MMHG | SYSTOLIC BLOOD PRESSURE: 154 MMHG | HEART RATE: 78 BPM | TEMPERATURE: 98 F | WEIGHT: 185.44 LBS | BODY MASS INDEX: 33.91 KG/M2

## 2019-04-12 DIAGNOSIS — J39.8 TRACHEAL STENOSIS: Primary | ICD-10-CM

## 2019-04-12 DIAGNOSIS — J38.6 SUBGLOTTIC STENOSIS: ICD-10-CM

## 2019-04-12 DIAGNOSIS — J38.6 SUBGLOTTIC STENOSIS: Primary | ICD-10-CM

## 2019-04-12 PROCEDURE — 31573 LARGSC W/THER INJECTION: CPT | Mod: S$GLB,,, | Performed by: OTOLARYNGOLOGY

## 2019-04-12 PROCEDURE — 31573 PR LARYNGOSCOPY, FLEXIBLE; W/ THER INJ, UNI: ICD-10-PCS | Mod: S$GLB,,, | Performed by: OTOLARYNGOLOGY

## 2019-04-12 RX ORDER — TRIAMCINOLONE ACETONIDE 40 MG/ML
20 INJECTION, SUSPENSION INTRA-ARTICULAR; INTRAMUSCULAR
Status: COMPLETED | OUTPATIENT
Start: 2019-04-12 | End: 2019-04-12

## 2019-04-12 RX ADMIN — TRIAMCINOLONE ACETONIDE 20 MG: 40 INJECTION, SUSPENSION INTRA-ARTICULAR; INTRAMUSCULAR at 01:04

## 2019-04-12 NOTE — PATIENT INSTRUCTIONS
Call with questions      Triamcinolone injection  What is this medicine?  TRIAMCINOLONE (trye am SIN oh lone) is a corticosteroid. It helps to reduce swelling, redness, itching, and allergic reactions. This medicine is used to treat allergies, arthritis, asthma, skin problems, and many other conditions.  How should I use this medicine?  This medicine is injected by a health care professional. After your dose follow your doctor's instructions for your care.  Contact your pediatrician regarding the use of this medicine in children. Special care may be needed.  What side effects may I notice from receiving this medicine?  Side effects that you should report to your doctor or health care professional as soon as possible:  · allergic reactions like skin rash, itching or hives, swelling of the face, lips, or tongue  · black, tarry stools  · breathing problems  · changes in vision  · confusion, depression, excitement, mood swings  · dizziness  · fever, infection, sores that do not heal  · frequent passing of urine  · high blood pressure  · increased thirst  · lumpy, thin skin at site where injected  · menstrual problems  · pain in back, hips, shoulders, ribs  · rounding of face  · seizures  · stomach pain  · swelling of feet, hands  · unusual bruising or red pinpoint spots on the skin  · unusually weak or tired  Side effects that usually do not require medical attention (report to your doctor or health care professional if they continue or are bothersome):  · headache  · increased sweating  · trouble sleeping  · unusual increased growth of hair on the face or body  · upset stomach, nausea  What may interact with this medicine?  Do not take this medicine with any of the following medications:  · mifepristone  This medicine may also interact with the following medications:  · aspirin  · other steroid medicines  · vaccines and other immunization products  What if I miss a dose?  This does not apply.  Where should I keep my  medicine?  Keep out of the reach of children.  Store at room temperature between 15 and 30 degrees C (59 and 86 degrees F). Do not freeze. Protect from light. Throw away any unused medicine after the expiration date.  What should I tell my health care provider before I take this medicine?  They need to know if you have any of these conditions:  · depression, anxiety, or psychotic disturbances  · diabetes  · infection, like tuberculosis, herpes, or fungal infection  · liver disease  · osteoporosis  · previous heart attack  · seizures  · stomach or intestine disease  · thyroid disease  · an unusual or allergic reaction to triamcinolone, corticosteroids, benzyl alcohol, other medicines, foods, dyes, or preservatives  · pregnant or trying to get pregnant  · breast-feeding  What should I watch for while using this medicine?  Visit your doctor or health care professional for regular checks on your progress. If you are diabetic, check your blood sugar as directed. If you are taking this medicine for a long time, carry an identification card with your name, the type and dose of medicine, and your doctor's name and address.  You may need to be on a special diet while taking this medicine. Talk to your doctor.  Do not come in contact with people who have chickenpox or the measles while you are taking this medicine. If you do, call your doctor right away.  NOTE:This sheet is a summary. It may not cover all possible information. If you have questions about this medicine, talk to your doctor, pharmacist, or health care provider. Copyright© 2017 [employer]

## 2019-04-12 NOTE — PROCEDURES
Procedures   [facility]  Department of Otorhinolaryngology and Communication Sciences     Awake Laryngeal Procedure Operative Report     Preoperative Diagnosis:  Idiopathic tracheal stenosis.     Postoperative Diagnosis: Idiopathic tracheal stenosis.     Procedure: Magnified laryngotracheobronchoscopy with intralesional steroid injection (82711).     Surgeon: August Mendoza M.D.      Estimated blood loss: None.     Anesthesia: Local and topical.     Complications: None.     Procedure in detail: The patient was positively identified with two identifiers and was brought into the procedure suite. The patient was seated upright in a comfortable position. Final time-out was performed for verification purposes. Local cutaneous and subcutaneous anesthesia was not necessary. The nasal cavity was topically decongested with 1% phenylephrine and topically anesthetized with 4% lidocaine. The distal chip digital videolaryngoscope was advanced through the patients most patent nasal cavity. The larynx and trachea were topically anesthetized with 3 mL of 4% lidocaine administered in 2 aliquots via a trans-cricothyroid membrane injection and a brief transoral atomized treatment. Magnified laryngotracheobronchoscopy was performed, with notable findings as below:   - circumferential stenosis beginning at posterior glottis, tracking through subglottis into proximal trachea, airway lumen diameter estimates at 8 mm at narrowest     With an adequate degree of anesthesia obtained, attention was turned to steroid injection. A syringe pre-loaded with Kenalog 40 mg/mL was loaded onto a 1.5 inch 23 gauge needle, which was advanced into the diseased tissue. Under the guidance of magnified laryngotracheobronchoscopy the needle tip was advanced through the cricothyroid membrane into the submucosal plane in 5 locations circumferentially around the contracted tracheal walls, where injection of steroid was carried out. Appropriate  submucosal dispersement of the medication was noted. Total volume injected was 0.5 mL. The equipment was removed. The patient tolerated the procedure well without complication. All needle and instrument counts were correct at the completion of the case.     Attestation: As the attending of record, I was present and participated in all portions of this procedure.     Disposition: The patient was observed briefly before being discharged home in good condition. The patient was instructed to call the office or return to the emergency department in case of a fever greater than 101 degrees F, increasing pain not relieved by over-the-counter medication, shortness of breath or noisy breathing, difficulty swallowing, swelling, bleeding, or any other concerns. The patient will follow up for another injection in 4-6 weeks.     August Mendoza M.D.  [facility]  Department of Otorhinolaryngology and Communication Sciences

## 2019-05-17 ENCOUNTER — PROCEDURE VISIT (OUTPATIENT)
Dept: OTOLARYNGOLOGY | Facility: CLINIC | Age: 49
End: 2019-05-17
Payer: COMMERCIAL

## 2019-05-17 VITALS
DIASTOLIC BLOOD PRESSURE: 72 MMHG | HEART RATE: 74 BPM | WEIGHT: 183 LBS | SYSTOLIC BLOOD PRESSURE: 166 MMHG | BODY MASS INDEX: 33.47 KG/M2

## 2019-05-17 DIAGNOSIS — J39.8 TRACHEAL STENOSIS: ICD-10-CM

## 2019-05-17 DIAGNOSIS — J38.6 SUBGLOTTIC STENOSIS: Primary | ICD-10-CM

## 2019-05-17 PROCEDURE — 31573 PR LARYNGOSCOPY, FLEXIBLE; W/ THER INJ, UNI: ICD-10-PCS | Mod: S$GLB,,, | Performed by: OTOLARYNGOLOGY

## 2019-05-17 PROCEDURE — 31573 LARGSC W/THER INJECTION: CPT | Mod: S$GLB,,, | Performed by: OTOLARYNGOLOGY

## 2019-05-17 RX ORDER — TRIAMCINOLONE ACETONIDE 40 MG/ML
20 INJECTION, SUSPENSION INTRA-ARTICULAR; INTRAMUSCULAR
Status: COMPLETED | OUTPATIENT
Start: 2019-05-17 | End: 2019-05-17

## 2019-05-17 RX ADMIN — TRIAMCINOLONE ACETONIDE 20 MG: 40 INJECTION, SUSPENSION INTRA-ARTICULAR; INTRAMUSCULAR at 01:05

## 2019-05-17 NOTE — PROGRESS NOTES
Procedures   [facility]  Department of Otorhinolaryngology and Communication Sciences     Awake Laryngeal Procedure Operative Report     Preoperative Diagnosis:  Idiopathic tracheal stenosis.     Postoperative Diagnosis: Idiopathic tracheal stenosis.     Procedure: Magnified laryngotracheobronchoscopy with intralesional steroid injection (34519).     Surgeon: August Mendoza M.D.      Estimated blood loss: None.     Anesthesia: Local and topical.     Complications: None.     Procedure in detail: The patient was positively identified with two identifiers and was brought into the procedure suite. The patient was seated upright in a comfortable position. Final time-out was performed for verification purposes. Local cutaneous and subcutaneous anesthesia was not necessary. The nasal cavity was topically decongested with 1% phenylephrine and topically anesthetized with 4% lidocaine. The distal chip digital videolaryngoscope was advanced through the patients most patent nasal cavity. The larynx and trachea were topically anesthetized with 3 mL of 4% lidocaine administered in 2 aliquots via a trans-cricothyroid membrane injection and a brief transoral atomized treatment. Magnified laryngotracheobronchoscopy was performed, with notable findings as below:   - circumferential stenosis beginning at posterior glottis, tracking through subglottis into proximal trachea, airway lumen diameter estimates at 8 mm at narrowest     With an adequate degree of anesthesia obtained, attention was turned to steroid injection. A syringe pre-loaded with Kenalog 40 mg/mL was loaded onto a 1.5 inch 23 gauge needle, which was advanced into the diseased tissue. Under the guidance of magnified laryngotracheobronchoscopy the needle tip was advanced through the cricothyroid membrane into the submucosal plane in 5 locations circumferentially around the contracted tracheal walls, where injection of steroid was carried out. Appropriate  submucosal dispersement of the medication was noted. Total volume injected was 0.5 mL. The equipment was removed. The patient tolerated the procedure well without complication. All needle and instrument counts were correct at the completion of the case.     Attestation: As the attending of record, I was present and participated in all portions of this procedure.     Disposition: The patient was observed briefly before being discharged home in good condition. The patient was instructed to call the office or return to the emergency department in case of a fever greater than 101 degrees F, increasing pain not relieved by over-the-counter medication, shortness of breath or noisy breathing, difficulty swallowing, swelling, bleeding, or any other concerns. The patient will follow up for another injection in 4-6 weeks.     August Mendoza M.D.  [facility]  Department of Otorhinolaryngology and Communication Sciences

## 2019-05-17 NOTE — PROCEDURES
Procedures     [facility]  Department of Otorhinolaryngology and Communication Sciences     Awake Laryngeal Procedure Operative Report     Preoperative Diagnosis:  Idiopathic tracheal stenosis.     Postoperative Diagnosis: Idiopathic tracheal stenosis.     Procedure: Magnified laryngotracheobronchoscopy with intralesional steroid injection ([id number]).     Surgeon: August Mendoza M.D.      Estimated blood loss: None.     Anesthesia: Local and topical.     Complications: None.     Procedure in detail: The patient was positively identified with two identifiers and was brought into the procedure suite. The patient was seated upright in a comfortable position. Final time-out was performed for verification purposes. Local cutaneous and subcutaneous anesthesia was not necessary. The nasal cavity was topically decongested with 1% phenylephrine and topically anesthetized with 4% lidocaine. The distal chip digital videolaryngoscope was advanced through the patients most patent nasal cavity. The larynx and trachea were topically anesthetized with 3 mL of 4% lidocaine administered via the sideport of the endoscope via the laryngeal gargle technique. Magnified laryngotracheobronchoscopy was performed, with notable findings as below:   - circumferential stenosis beginning at posterior glottis, tracking through subglottis into proximal trachea, airway lumen diameter estimated at 9 mm at narrowest; there was a less hyperemic/granular quality than was present at the time of her last procedure     With an adequate degree of anesthesia obtained, attention was turned to steroid injection. A syringe pre-loaded with Kenalog 40 mg/mL was loaded onto a 1.5 inch 23 gauge needle, which was advanced into the diseased tissue. Under the guidance of magnified laryngotracheobronchoscopy the needle tip was advanced through the cricothyroid membrane into the submucosal plane in 7  locations circumferentially around the contracted  tracheal walls, where injection of steroid was carried out. Appropriate submucosal dispersement of the medication was noted. Total volume injected was 0.5 mL. The equipment was removed. The patient tolerated the procedure well without complication. All needle and instrument counts were correct at the completion of the case.     Attestation: As the attending of record, I was present and participated in all portions of this procedure.     Disposition: The patient was observed briefly before being discharged home in good condition. The patient was instructed to call the office or return to the emergency department in case of a fever greater than 101 degrees F, increasing pain not relieved by over-the-counter medication, shortness of breath or noisy breathing, difficulty swallowing, swelling, bleeding, or any other concerns. The patient will follow up for another injection in 4-6 weeks.     August Mendoza M.D.  [facility]  Department of Otorhinolaryngology and Communication Sciences

## 2019-06-09 DIAGNOSIS — I10 ESSENTIAL HYPERTENSION: ICD-10-CM

## 2019-06-09 DIAGNOSIS — K21.9 GASTROESOPHAGEAL REFLUX DISEASE WITHOUT ESOPHAGITIS: ICD-10-CM

## 2019-06-10 RX ORDER — BISOPROLOL FUMARATE AND HYDROCHLOROTHIAZIDE 10; 6.25 MG/1; MG/1
TABLET ORAL
Qty: 90 TABLET | Refills: 1 | Status: SHIPPED | OUTPATIENT
Start: 2019-06-10 | End: 2019-11-19 | Stop reason: SDUPTHER

## 2019-06-10 RX ORDER — OMEPRAZOLE 40 MG/1
CAPSULE, DELAYED RELEASE ORAL
Qty: 90 CAPSULE | Refills: 1 | Status: SHIPPED | OUTPATIENT
Start: 2019-06-10 | End: 2019-12-06 | Stop reason: SDUPTHER

## 2019-06-17 ENCOUNTER — OFFICE VISIT (OUTPATIENT)
Dept: FAMILY MEDICINE | Facility: CLINIC | Age: 49
End: 2019-06-17
Payer: COMMERCIAL

## 2019-06-17 ENCOUNTER — TELEPHONE (OUTPATIENT)
Dept: OTOLARYNGOLOGY | Facility: CLINIC | Age: 49
End: 2019-06-17

## 2019-06-17 VITALS
WEIGHT: 183.88 LBS | SYSTOLIC BLOOD PRESSURE: 142 MMHG | DIASTOLIC BLOOD PRESSURE: 72 MMHG | BODY MASS INDEX: 33.84 KG/M2 | HEART RATE: 71 BPM | HEIGHT: 62 IN

## 2019-06-17 DIAGNOSIS — I10 ESSENTIAL HYPERTENSION: Primary | ICD-10-CM

## 2019-06-17 DIAGNOSIS — J38.6 SUBGLOTTIC STENOSIS: ICD-10-CM

## 2019-06-17 DIAGNOSIS — F41.9 ANXIETY: ICD-10-CM

## 2019-06-17 DIAGNOSIS — F51.01 PRIMARY INSOMNIA: ICD-10-CM

## 2019-06-17 DIAGNOSIS — K21.9 GASTROESOPHAGEAL REFLUX DISEASE WITHOUT ESOPHAGITIS: ICD-10-CM

## 2019-06-17 DIAGNOSIS — B00.1 FEVER BLISTER: ICD-10-CM

## 2019-06-17 DIAGNOSIS — J39.8 TRACHEAL STENOSIS: Primary | ICD-10-CM

## 2019-06-17 PROCEDURE — 3008F BODY MASS INDEX DOCD: CPT | Mod: CPTII,S$GLB,, | Performed by: FAMILY MEDICINE

## 2019-06-17 PROCEDURE — 99999 PR PBB SHADOW E&M-EST. PATIENT-LVL III: CPT | Mod: PBBFAC,,, | Performed by: FAMILY MEDICINE

## 2019-06-17 PROCEDURE — 3077F SYST BP >= 140 MM HG: CPT | Mod: CPTII,S$GLB,, | Performed by: FAMILY MEDICINE

## 2019-06-17 PROCEDURE — 3078F PR MOST RECENT DIASTOLIC BLOOD PRESSURE < 80 MM HG: ICD-10-PCS | Mod: CPTII,S$GLB,, | Performed by: FAMILY MEDICINE

## 2019-06-17 PROCEDURE — 3008F PR BODY MASS INDEX (BMI) DOCUMENTED: ICD-10-PCS | Mod: CPTII,S$GLB,, | Performed by: FAMILY MEDICINE

## 2019-06-17 PROCEDURE — 99214 OFFICE O/P EST MOD 30 MIN: CPT | Mod: S$GLB,,, | Performed by: FAMILY MEDICINE

## 2019-06-17 PROCEDURE — 99214 PR OFFICE/OUTPT VISIT, EST, LEVL IV, 30-39 MIN: ICD-10-PCS | Mod: S$GLB,,, | Performed by: FAMILY MEDICINE

## 2019-06-17 PROCEDURE — 3078F DIAST BP <80 MM HG: CPT | Mod: CPTII,S$GLB,, | Performed by: FAMILY MEDICINE

## 2019-06-17 PROCEDURE — 3077F PR MOST RECENT SYSTOLIC BLOOD PRESSURE >= 140 MM HG: ICD-10-PCS | Mod: CPTII,S$GLB,, | Performed by: FAMILY MEDICINE

## 2019-06-17 PROCEDURE — 99999 PR PBB SHADOW E&M-EST. PATIENT-LVL III: ICD-10-PCS | Mod: PBBFAC,,, | Performed by: FAMILY MEDICINE

## 2019-06-17 RX ORDER — ZOLPIDEM TARTRATE 10 MG/1
10 TABLET ORAL NIGHTLY PRN
Qty: 90 TABLET | Refills: 1 | Status: SHIPPED | OUTPATIENT
Start: 2019-06-17 | End: 2019-11-19 | Stop reason: SDUPTHER

## 2019-06-17 RX ORDER — ESCITALOPRAM OXALATE 10 MG/1
10 TABLET ORAL DAILY
Qty: 90 TABLET | Refills: 1 | Status: SHIPPED | OUTPATIENT
Start: 2019-06-17 | End: 2019-11-19 | Stop reason: SDUPTHER

## 2019-06-17 NOTE — PROGRESS NOTES
"Subjective:       Patient ID: Denise Fry is a 48 y.o. female.    Chief Complaint: Follow-up (6 month)    Pt is known to me.   Pt is here for followup of chronic medical issues.  The pt is getting good BP readings at home.  She gets anxious when she comes to the doctor and her BP goes up.  She gets steroid injections and procedures per Dr. Alva for laryngeal stenosis.  She uses her Prilosec daily per Dr. Alva's recommendations.  She sleeps well with the Ambien.  She has no side effects.    Review of Systems   Constitutional: Negative for activity change, appetite change, fatigue and unexpected weight change.   Eyes: Negative for visual disturbance.   Respiratory: Negative for cough, chest tightness and shortness of breath.    Cardiovascular: Negative for chest pain, palpitations and leg swelling.   Gastrointestinal: Negative for abdominal pain, constipation, diarrhea, nausea and vomiting.   Endocrine: Negative for cold intolerance, heat intolerance and polyuria.   Genitourinary: Negative for decreased urine volume and dysuria.   Musculoskeletal: Negative for arthralgias and back pain.   Skin: Negative for rash.   Neurological: Negative for numbness and headaches.   Psychiatric/Behavioral: Positive for sleep disturbance. The patient is nervous/anxious.        Objective:       Vitals:    06/17/19 1023   BP: (!) 146/70   BP Location: Right arm   Patient Position: Sitting   BP Method: Medium (Manual)   Pulse: 71   Weight: 83.4 kg (183 lb 13.8 oz)   Height: 5' 2" (1.575 m)     Physical Exam   Constitutional: She is oriented to person, place, and time. She appears well-developed and well-nourished.   HENT:   Head: Normocephalic.   Eyes: Pupils are equal, round, and reactive to light. Conjunctivae and EOM are normal.   Neck: Normal range of motion. Neck supple. No thyromegaly present.   Cardiovascular: Normal rate, regular rhythm and normal heart sounds.   Pulmonary/Chest: Effort normal and breath sounds normal. "   Abdominal: Soft. Bowel sounds are normal. There is no tenderness.   Musculoskeletal: Normal range of motion. She exhibits no tenderness or deformity.   Lymphadenopathy:     She has no cervical adenopathy.   Neurological: She is alert and oriented to person, place, and time. She displays normal reflexes. No cranial nerve deficit. She exhibits normal muscle tone. Coordination normal.   Skin: Skin is warm and dry.   Psychiatric: She has a normal mood and affect. Her behavior is normal.       Assessment:       1. Essential hypertension    2. Subglottic stenosis    3. Gastroesophageal reflux disease without esophagitis    4. Primary insomnia    5. Fever blister    6. Anxiety        Plan:       Denise was seen today for follow-up.    Diagnoses and all orders for this visit:    Essential hypertension    Subglottic stenosis    Gastroesophageal reflux disease without esophagitis    Primary insomnia  -     zolpidem (AMBIEN) 10 mg Tab; Take 1 tablet (10 mg total) by mouth nightly as needed.    Fever blister    Anxiety  -     escitalopram oxalate (LEXAPRO) 10 MG tablet; Take 1 tablet (10 mg total) by mouth once daily.      During this visit, I reviewed the pt's history, medications, allergies, and problem list.    We reviewed her recent lab work

## 2019-06-17 NOTE — TELEPHONE ENCOUNTER
----- Message from Rachel Womack sent at 6/17/2019  9:34 AM CDT -----  Contact: pt   Needs Advice    Reason for call: pt is calling to speak with the nurse pt is having a procedure on Friday this week pt is making sure that the insurance is in order and that they have everything they need         Communication Preference: can you please call pt at [phone]    Additional Information: none    CRISTOPHER

## 2019-06-21 ENCOUNTER — CLINICAL SUPPORT (OUTPATIENT)
Dept: OTOLARYNGOLOGY | Facility: CLINIC | Age: 49
End: 2019-06-21
Payer: COMMERCIAL

## 2019-06-21 VITALS
SYSTOLIC BLOOD PRESSURE: 179 MMHG | WEIGHT: 171.06 LBS | DIASTOLIC BLOOD PRESSURE: 92 MMHG | BODY MASS INDEX: 31.29 KG/M2 | TEMPERATURE: 98 F | HEART RATE: 68 BPM

## 2019-06-21 DIAGNOSIS — J39.8 TRACHEAL STENOSIS: Primary | ICD-10-CM

## 2019-06-21 DIAGNOSIS — J38.6 SUBGLOTTIC STENOSIS: Primary | ICD-10-CM

## 2019-06-21 DIAGNOSIS — J39.8 TRACHEAL STENOSIS: ICD-10-CM

## 2019-06-21 PROCEDURE — 99999 PR PBB SHADOW E&M-EST. PATIENT-LVL III: CPT | Mod: PBBFAC,,, | Performed by: OTOLARYNGOLOGY

## 2019-06-21 PROCEDURE — 31573 PR LARYNGOSCOPY, FLEXIBLE; W/ THER INJ, UNI: ICD-10-PCS | Mod: S$GLB,,, | Performed by: OTOLARYNGOLOGY

## 2019-06-21 PROCEDURE — 99999 PR PBB SHADOW E&M-EST. PATIENT-LVL III: ICD-10-PCS | Mod: PBBFAC,,, | Performed by: OTOLARYNGOLOGY

## 2019-06-21 PROCEDURE — 31573 LARGSC W/THER INJECTION: CPT | Mod: S$GLB,,, | Performed by: OTOLARYNGOLOGY

## 2019-06-21 RX ORDER — TRIAMCINOLONE ACETONIDE 40 MG/ML
20 INJECTION, SUSPENSION INTRA-ARTICULAR; INTRAMUSCULAR
Status: COMPLETED | OUTPATIENT
Start: 2019-06-21 | End: 2019-06-21

## 2019-06-21 RX ADMIN — TRIAMCINOLONE ACETONIDE 20 MG: 40 INJECTION, SUSPENSION INTRA-ARTICULAR; INTRAMUSCULAR at 01:06

## 2019-06-21 NOTE — PROGRESS NOTES
Procedures   [facility]  Department of Otorhinolaryngology and Communication Sciences     Awake Laryngeal Procedure Operative Report     Preoperative Diagnosis:  Idiopathic tracheal stenosis.     Postoperative Diagnosis: Idiopathic tracheal stenosis.     Procedure: Magnified laryngotracheobronchoscopy with intralesional steroid injection ([id number]).     Surgeon: August Mendoza M.D.      Estimated blood loss: None.     Anesthesia: Local and topical.     Complications: None.     Procedure in detail: The patient was positively identified with two identifiers and was brought into the procedure suite. The patient was seated upright in a comfortable position. Final time-out was performed for verification purposes. Local cutaneous and subcutaneous anesthesia was not necessary. The nasal cavity was topically decongested with 1% phenylephrine and topically anesthetized with 4% lidocaine. The distal chip digital videolaryngoscope was advanced through the patients most patent nasal cavity. The larynx and trachea were topically anesthetized with 3 mL of 4% lidocaine administered via the sideport of the endoscope via the laryngeal gargle technique. Magnified laryngotracheobronchoscopy was performed, with notable findings as below:   - circumferential stenosis beginning at posterior glottis, tracking through subglottis into proximal trachea, airway lumen diameter estimated at 9 mm at narrowest; essentially stable since last procedure     With an adequate degree of anesthesia obtained, attention was turned to steroid injection. A syringe pre-loaded with Kenalog 40 mg/mL was loaded onto a 1.5 inch 23 gauge needle, which was advanced into the diseased tissue. Under the guidance of magnified laryngotracheobronchoscopy the needle tip was advanced through the cricothyroid membrane into the submucosal plane in 9 locations circumferentially around the contracted tracheal walls, where injection of steroid was carried out.  Appropriate submucosal dispersement of the medication was noted. Total volume injected was 0.5 mL. The equipment was removed. The patient tolerated the procedure well without complication. All needle and instrument counts were correct at the completion of the case.     Attestation: As the attending of record, I was present and participated in all portions of this procedure.     Disposition: The patient was observed briefly before being discharged home in good condition. The patient was instructed to call the office or return to the emergency department in case of a fever greater than 101 degrees F, increasing pain not relieved by over-the-counter medication, shortness of breath or noisy breathing, difficulty swallowing, swelling, bleeding, or any other concerns. The patient will follow up for a final injection in 4-6 weeks.     August Mendoza M.D.  [facility]  Department of Otorhinolaryngology and Communication Sciences

## 2019-07-19 ENCOUNTER — PROCEDURE VISIT (OUTPATIENT)
Dept: OTOLARYNGOLOGY | Facility: CLINIC | Age: 49
End: 2019-07-19
Payer: COMMERCIAL

## 2019-07-19 VITALS — BODY MASS INDEX: 32.58 KG/M2 | WEIGHT: 178.13 LBS

## 2019-07-19 DIAGNOSIS — J38.6 SUBGLOTTIC STENOSIS: Primary | ICD-10-CM

## 2019-07-19 DIAGNOSIS — J39.8 TRACHEAL STENOSIS: ICD-10-CM

## 2019-07-19 PROCEDURE — 31573 PR LARYNGOSCOPY, FLEXIBLE; W/ THER INJ, UNI: ICD-10-PCS | Mod: S$GLB,,, | Performed by: OTOLARYNGOLOGY

## 2019-07-19 PROCEDURE — 31573 LARGSC W/THER INJECTION: CPT | Mod: S$GLB,,, | Performed by: OTOLARYNGOLOGY

## 2019-07-19 RX ORDER — TRIAMCINOLONE ACETONIDE 40 MG/ML
20 INJECTION, SUSPENSION INTRA-ARTICULAR; INTRAMUSCULAR
Status: COMPLETED | OUTPATIENT
Start: 2019-07-19 | End: 2019-07-19

## 2019-07-19 RX ADMIN — TRIAMCINOLONE ACETONIDE 20 MG: 40 INJECTION, SUSPENSION INTRA-ARTICULAR; INTRAMUSCULAR at 04:07

## 2019-07-19 NOTE — PROCEDURES
Procedures     [facility]  Department of Otorhinolaryngology and Communication Sciences     Awake Laryngeal Procedure Operative Report     Preoperative Diagnosis:  Idiopathic tracheal stenosis.     Postoperative Diagnosis: Idiopathic tracheal stenosis.     Procedure: Magnified laryngotracheobronchoscopy with intralesional steroid injection ([id number]).     Surgeon: August Mendoza M.D.      Estimated blood loss: None.     Anesthesia: Local and topical.     Complications: None.     Procedure in detail: The patient was positively identified with two identifiers and was brought into the procedure suite. The patient was seated upright in a comfortable position. Final time-out was performed for verification purposes. Local cutaneous and subcutaneous anesthesia was not necessary. The nasal cavity was topically decongested with 1% phenylephrine and topically anesthetized with 4% lidocaine. The distal chip digital videolaryngoscope was advanced through the patients most patent nasal cavity. The larynx and trachea were topically anesthetized with 3 mL of 4% lidocaine administered via the sideport of the endoscope via the laryngeal gargle technique. Magnified laryngotracheobronchoscopy was performed, with notable findings as below:   - circumferential stenosis beginning at posterior glottis, tracking through subglottis into proximal trachea, airway lumen diameter estimated at 9 mm at narrowest; with mild interval increase in patency since last procedure     With an adequate degree of anesthesia obtained, attention was turned to steroid injection. A syringe pre-loaded with Kenalog 40 mg/mL was loaded onto a 1.5 inch 23 gauge needle, which was advanced into the diseased tissue. Under the guidance of magnified laryngotracheobronchoscopy the needle tip was advanced through the cricothyroid membrane into the submucosal plane in 7 locations circumferentially around the contracted tracheal walls, where injection of steroid  was carried out. Appropriate submucosal dispersement of the medication was noted. Total volume injected was 0.5 mL. The equipment was removed. The patient tolerated the procedure well without complication. All needle and instrument counts were correct at the completion of the case.     Attestation: As the attending of record, I was present and participated in all portions of this procedure.     Disposition: The patient was observed briefly before being discharged home in good condition. The patient was instructed to call the office or return to the emergency department in case of a fever greater than 101 degrees F, increasing pain not relieved by over-the-counter medication, shortness of breath or noisy breathing, difficulty swallowing, swelling, bleeding, or any other concerns. The patient will follow up in 6-8 weeks.     August Mendoza M.D.  [facility]  Department of Otorhinolaryngology and Communication Sciences

## 2019-08-28 DIAGNOSIS — B00.1 FEVER BLISTER: ICD-10-CM

## 2019-08-30 RX ORDER — VALACYCLOVIR HYDROCHLORIDE 500 MG/1
TABLET, FILM COATED ORAL
Qty: 90 TABLET | Refills: 8 | Status: SHIPPED | OUTPATIENT
Start: 2019-08-30 | End: 2020-09-17

## 2019-09-04 ENCOUNTER — TELEPHONE (OUTPATIENT)
Dept: FAMILY MEDICINE | Facility: CLINIC | Age: 49
End: 2019-09-04

## 2019-09-04 NOTE — TELEPHONE ENCOUNTER
----- Message from Lizz Ashby sent at 9/3/2019  4:40 PM CDT -----  Contact: patient  Type: Needs Medical Advice    Who Called:  patient  Symptoms (please be specific):  na  How long has patient had these symptoms:  [place]  Pharmacy name and phone #:  [facility]  Best Call Back Number: [phone]  Additional Information: Patient would like to schedule annual and pap and has questions for the nurse.  Please call to advise and schedule. Thanks!

## 2019-09-11 ENCOUNTER — OFFICE VISIT (OUTPATIENT)
Dept: OTOLARYNGOLOGY | Facility: CLINIC | Age: 49
End: 2019-09-11
Payer: COMMERCIAL

## 2019-09-11 VITALS
HEART RATE: 69 BPM | WEIGHT: 185 LBS | SYSTOLIC BLOOD PRESSURE: 179 MMHG | DIASTOLIC BLOOD PRESSURE: 81 MMHG | BODY MASS INDEX: 33.84 KG/M2

## 2019-09-11 DIAGNOSIS — J39.8 TRACHEAL STENOSIS: ICD-10-CM

## 2019-09-11 DIAGNOSIS — J38.6 SUBGLOTTIC STENOSIS: Primary | ICD-10-CM

## 2019-09-11 DIAGNOSIS — J01.00 ACUTE NON-RECURRENT MAXILLARY SINUSITIS: ICD-10-CM

## 2019-09-11 PROCEDURE — 99213 OFFICE O/P EST LOW 20 MIN: CPT | Mod: 25,S$GLB,, | Performed by: OTOLARYNGOLOGY

## 2019-09-11 PROCEDURE — 3077F SYST BP >= 140 MM HG: CPT | Mod: CPTII,S$GLB,, | Performed by: OTOLARYNGOLOGY

## 2019-09-11 PROCEDURE — 3079F DIAST BP 80-89 MM HG: CPT | Mod: CPTII,S$GLB,, | Performed by: OTOLARYNGOLOGY

## 2019-09-11 PROCEDURE — 99999 PR PBB SHADOW E&M-EST. PATIENT-LVL III: ICD-10-PCS | Mod: PBBFAC,,, | Performed by: OTOLARYNGOLOGY

## 2019-09-11 PROCEDURE — 99999 PR PBB SHADOW E&M-EST. PATIENT-LVL III: CPT | Mod: PBBFAC,,, | Performed by: OTOLARYNGOLOGY

## 2019-09-11 PROCEDURE — 31575 PR LARYNGOSCOPY, FLEXIBLE; DIAGNOSTIC: ICD-10-PCS | Mod: S$GLB,,, | Performed by: OTOLARYNGOLOGY

## 2019-09-11 PROCEDURE — 3008F BODY MASS INDEX DOCD: CPT | Mod: CPTII,S$GLB,, | Performed by: OTOLARYNGOLOGY

## 2019-09-11 PROCEDURE — 3079F PR MOST RECENT DIASTOLIC BLOOD PRESSURE 80-89 MM HG: ICD-10-PCS | Mod: CPTII,S$GLB,, | Performed by: OTOLARYNGOLOGY

## 2019-09-11 PROCEDURE — 3077F PR MOST RECENT SYSTOLIC BLOOD PRESSURE >= 140 MM HG: ICD-10-PCS | Mod: CPTII,S$GLB,, | Performed by: OTOLARYNGOLOGY

## 2019-09-11 PROCEDURE — 99213 PR OFFICE/OUTPT VISIT, EST, LEVL III, 20-29 MIN: ICD-10-PCS | Mod: 25,S$GLB,, | Performed by: OTOLARYNGOLOGY

## 2019-09-11 PROCEDURE — 3008F PR BODY MASS INDEX (BMI) DOCUMENTED: ICD-10-PCS | Mod: CPTII,S$GLB,, | Performed by: OTOLARYNGOLOGY

## 2019-09-11 PROCEDURE — 31575 DIAGNOSTIC LARYNGOSCOPY: CPT | Mod: S$GLB,,, | Performed by: OTOLARYNGOLOGY

## 2019-09-11 RX ORDER — AMOXICILLIN AND CLAVULANATE POTASSIUM 875; 125 MG/1; MG/1
1 TABLET, FILM COATED ORAL 2 TIMES DAILY
Qty: 20 TABLET | Refills: 0 | Status: SHIPPED | OUTPATIENT
Start: 2019-09-11 | End: 2019-09-21

## 2019-09-11 NOTE — PROGRESS NOTES
[facility]  Department of Otorhinolaryngology and Communication Sciences    Subjective:      Denise Fry is a 49 y.o. female who presents for follow-up. She has idiopathic laryngotracheal stenosis.    TREATMENT HISTORY:  11/13/2018 - SML/TB; CO2 laser; dilation 12 mm; kenalog  4/2019-7/2019 - office steroid injections    Her airway has been fine. Breathing is good. No deterioration since July. No limitation when exercising. Voice is good. Remains pleased with her progress.     Today complains of nasal congestion, postnasal drip, facial pressure bilateral cheeks. Colored nasal discharge. No fevers. No anosmia.    The patient's medications, allergies, past medical, surgical, social and family histories were reviewed and updated as appropriate.    A detailed review of systems was obtained with pertinent positives as per the above HPI, and otherwise negative.      Objective:     BP (!) 179/81 (Patient Position: Sitting)   Pulse 69   Wt 83.9 kg (185 lb)   BMI 33.84 kg/m²      Constitutional: comfortable, well dressed  Psychiatric: appropriate affect  Respiratory: comfortably breathing, symmetric chest rise, very faint coarse biphasic stridor (inspiration>expiration)   Voice: normal for age and gender  Head: normocephalic  Eyes: conjunctivae and sclerae clear  Indirect laryngoscopy is limited due to gag    Procedure  Flexible Laryngoscopy (16256): Laryngoscopy is indicated for assessment of upper aerodigestive structure and function. This was carried out transnasally with a distal chip videoendoscope. After verbal consent was obtained, the patient was positioned and the nose was topically decongested with 1% phenylephrine and topically anesthetized with 4% lidocaine. The endoscope was passed through the most patent nasal cavity and positioned to image the nasopharynx, larynx, and hypopharynx in detail. The following features were examined: nasopharyngeal, laryngeal, hypopharyngeal masses; velopharyngeal  strength, closure, and symmetry of motion; vocal fold range and symmetry of motion; laryngeal mucosal edema, erythema, inflammation, and hydration; salivary pooling; and gross laryngeal sensation. The equipment was removed. The patient tolerated the procedure well without complication. All findings were normal except:  - circumferential stenosis beginning at posterior glottis, tracking through subglottis into proximal trachea;    - durable improvements in patency, though with mild persistent contracture of airway walls  - right vocal fold with slightly limited lateral excursion and slightly elongated      Assessment:     Denise Fry is a 49 y.o. female with idiopathic laryngotracheal stenosis, stable, and acute rhinosinusitis.         Plan:     Reassurance was provided. I recommended empiric treatment with PO augmentin.    She will follow up with me in 3 months, or sooner if needed.    All questions were answered and the patient would like to proceed.  We will arrange this in the coming weeks.    August Mendoza M.D.  [facility]  Department of Otorhinolaryngology and Communication Sciences

## 2019-09-11 NOTE — PATIENT INSTRUCTIONS
Amoxicillin; Clavulanic Acid tablets  What is this medicine?  AMOXICILLIN; CLAVULANIC ACID (a mox i JOVAN in; MIREYA osmar noel ic AS id) is a penicillin antibiotic. It is used to treat certain kinds of bacterial infections. It will not work for colds, flu, or other viral infections.  How should I use this medicine?  Take this medicine by mouth with a full glass of water. Follow the directions on the prescription label. Take at the start of a meal. Do not crush or chew. If the tablet has a score line, you may cut it in half at the score line for easier swallowing. Take your medicine at regular intervals. Do not take your medicine more often than directed. Take all of your medicine as directed even if you think you are better. Do not skip doses or stop your medicine early.  Talk to your pediatrician regarding the use of this medicine in children. Special care may be needed.  What side effects may I notice from receiving this medicine?  Side effects that you should report to your doctor or health care professional as soon as possible:  · allergic reactions like skin rash, itching or hives, swelling of the face, lips, or tongue  · breathing problems  · dark urine  · fever or chills, sore throat  · redness, blistering, peeling or loosening of the skin, including inside the mouth  · seizures  · trouble passing urine or change in the amount of urine  · unusual bleeding, bruising  · unusually weak or tired  · white patches or sores in the mouth or throat  Side effects that usually do not require medical attention (report to your doctor or health care professional if they continue or are bothersome):  · diarrhea  · dizziness  · headache  · nausea, vomiting  · stomach upset  · vaginal or anal irritation  What may interact with this medicine?  · allopurinol  · anticoagulants  · birth control pills  · methotrexate  · probenecid  What if I miss a dose?  If you miss a dose, take it as soon as you can. If it is almost time for your  next dose, take only that dose. Do not take double or extra doses.  Where should I keep my medicine?  Keep out of the reach of children.  Store at room temperature below 25 degrees C (77 degrees F). Keep container tightly closed. Throw away any unused medicine after the expiration date.  What should I tell my health care provider before I take this medicine?  They need to know if you have any of these conditions:  · bowel disease, like colitis  · kidney disease  · liver disease  · mononucleosis  · an unusual or allergic reaction to amoxicillin, penicillin, cephalosporin, other antibiotics, clavulanic acid, other medicines, foods, dyes, or preservatives  · pregnant or trying to get pregnant  · breast-feeding  What should I watch for while using this medicine?  Tell your doctor or health care professional if your symptoms do not improve.  Do not treat diarrhea with over the counter products. Contact your doctor if you have diarrhea that lasts more than 2 days or if it is severe and watery.  If you have diabetes, you may get a false-positive result for sugar in your urine. Check with your doctor or health care professional.  Birth control pills may not work properly while you are taking this medicine. Talk to your doctor about using an extra method of birth control.  NOTE:This sheet is a summary. It may not cover all possible information. If you have questions about this medicine, talk to your doctor, pharmacist, or health care provider. Copyright© 2017 [employer]

## 2019-09-23 ENCOUNTER — PATIENT OUTREACH (OUTPATIENT)
Dept: ADMINISTRATIVE | Facility: HOSPITAL | Age: 49
End: 2019-09-23

## 2019-10-10 ENCOUNTER — OFFICE VISIT (OUTPATIENT)
Dept: FAMILY MEDICINE | Facility: CLINIC | Age: 49
End: 2019-10-10
Payer: COMMERCIAL

## 2019-10-10 ENCOUNTER — LAB VISIT (OUTPATIENT)
Dept: LAB | Facility: HOSPITAL | Age: 49
End: 2019-10-10
Attending: PHYSICIAN ASSISTANT
Payer: COMMERCIAL

## 2019-10-10 VITALS
HEIGHT: 62 IN | BODY MASS INDEX: 33.79 KG/M2 | HEART RATE: 67 BPM | OXYGEN SATURATION: 97 % | WEIGHT: 183.63 LBS | DIASTOLIC BLOOD PRESSURE: 80 MMHG | SYSTOLIC BLOOD PRESSURE: 140 MMHG | TEMPERATURE: 99 F

## 2019-10-10 DIAGNOSIS — Z12.39 SCREENING FOR BREAST CANCER: ICD-10-CM

## 2019-10-10 DIAGNOSIS — Z00.00 WELLNESS EXAMINATION: ICD-10-CM

## 2019-10-10 DIAGNOSIS — Z13.29 SCREENING FOR THYROID DISORDER: ICD-10-CM

## 2019-10-10 DIAGNOSIS — Z00.00 WELLNESS EXAMINATION: Primary | ICD-10-CM

## 2019-10-10 DIAGNOSIS — F41.9 ANXIETY: ICD-10-CM

## 2019-10-10 DIAGNOSIS — K21.9 GASTROESOPHAGEAL REFLUX DISEASE WITHOUT ESOPHAGITIS: ICD-10-CM

## 2019-10-10 DIAGNOSIS — I10 ESSENTIAL HYPERTENSION: ICD-10-CM

## 2019-10-10 DIAGNOSIS — F51.01 PRIMARY INSOMNIA: ICD-10-CM

## 2019-10-10 LAB
BASOPHILS # BLD AUTO: 0.05 K/UL (ref 0–0.2)
BASOPHILS NFR BLD: 0.9 % (ref 0–1.9)
DIFFERENTIAL METHOD: ABNORMAL
EOSINOPHIL # BLD AUTO: 0.1 K/UL (ref 0–0.5)
EOSINOPHIL NFR BLD: 1.6 % (ref 0–8)
ERYTHROCYTE [DISTWIDTH] IN BLOOD BY AUTOMATED COUNT: 13 % (ref 11.5–14.5)
HCT VFR BLD AUTO: 44.4 % (ref 37–48.5)
HGB BLD-MCNC: 13.7 G/DL (ref 12–16)
IMM GRANULOCYTES # BLD AUTO: 0 K/UL (ref 0–0.04)
IMM GRANULOCYTES NFR BLD AUTO: 0 % (ref 0–0.5)
LYMPHOCYTES # BLD AUTO: 1.5 K/UL (ref 1–4.8)
LYMPHOCYTES NFR BLD: 26.4 % (ref 18–48)
MCH RBC QN AUTO: 28.4 PG (ref 27–31)
MCHC RBC AUTO-ENTMCNC: 30.9 G/DL (ref 32–36)
MCV RBC AUTO: 92 FL (ref 82–98)
MONOCYTES # BLD AUTO: 0.4 K/UL (ref 0.3–1)
MONOCYTES NFR BLD: 7.8 % (ref 4–15)
NEUTROPHILS # BLD AUTO: 3.5 K/UL (ref 1.8–7.7)
NEUTROPHILS NFR BLD: 63.3 % (ref 38–73)
NRBC BLD-RTO: 0 /100 WBC
PLATELET # BLD AUTO: 223 K/UL (ref 150–350)
PMV BLD AUTO: 12 FL (ref 9.2–12.9)
RBC # BLD AUTO: 4.83 M/UL (ref 4–5.4)
TSH SERPL DL<=0.005 MIU/L-ACNC: 1.25 UIU/ML (ref 0.4–4)
WBC # BLD AUTO: 5.53 K/UL (ref 3.9–12.7)

## 2019-10-10 PROCEDURE — 99396 PR PREVENTIVE VISIT,EST,40-64: ICD-10-PCS | Mod: 25,S$GLB,, | Performed by: PHYSICIAN ASSISTANT

## 2019-10-10 PROCEDURE — 84443 ASSAY THYROID STIM HORMONE: CPT

## 2019-10-10 PROCEDURE — 90471 IMMUNIZATION ADMIN: CPT | Mod: S$GLB,,, | Performed by: PHYSICIAN ASSISTANT

## 2019-10-10 PROCEDURE — 99999 PR PBB SHADOW E&M-EST. PATIENT-LVL IV: ICD-10-PCS | Mod: PBBFAC,,, | Performed by: PHYSICIAN ASSISTANT

## 2019-10-10 PROCEDURE — 3077F SYST BP >= 140 MM HG: CPT | Mod: CPTII,S$GLB,, | Performed by: PHYSICIAN ASSISTANT

## 2019-10-10 PROCEDURE — 3079F DIAST BP 80-89 MM HG: CPT | Mod: CPTII,S$GLB,, | Performed by: PHYSICIAN ASSISTANT

## 2019-10-10 PROCEDURE — 99999 PR PBB SHADOW E&M-EST. PATIENT-LVL IV: CPT | Mod: PBBFAC,,, | Performed by: PHYSICIAN ASSISTANT

## 2019-10-10 PROCEDURE — 85025 COMPLETE CBC W/AUTO DIFF WBC: CPT

## 2019-10-10 PROCEDURE — 3079F PR MOST RECENT DIASTOLIC BLOOD PRESSURE 80-89 MM HG: ICD-10-PCS | Mod: CPTII,S$GLB,, | Performed by: PHYSICIAN ASSISTANT

## 2019-10-10 PROCEDURE — 90686 IIV4 VACC NO PRSV 0.5 ML IM: CPT | Mod: S$GLB,,, | Performed by: PHYSICIAN ASSISTANT

## 2019-10-10 PROCEDURE — 90686 FLU VACCINE (QUAD) GREATER THAN OR EQUAL TO 3YO PRESERVATIVE FREE IM: ICD-10-PCS | Mod: S$GLB,,, | Performed by: PHYSICIAN ASSISTANT

## 2019-10-10 PROCEDURE — 90471 FLU VACCINE (QUAD) GREATER THAN OR EQUAL TO 3YO PRESERVATIVE FREE IM: ICD-10-PCS | Mod: S$GLB,,, | Performed by: PHYSICIAN ASSISTANT

## 2019-10-10 PROCEDURE — 99396 PREV VISIT EST AGE 40-64: CPT | Mod: 25,S$GLB,, | Performed by: PHYSICIAN ASSISTANT

## 2019-10-10 PROCEDURE — 36415 COLL VENOUS BLD VENIPUNCTURE: CPT | Mod: PO

## 2019-10-10 PROCEDURE — 3077F PR MOST RECENT SYSTOLIC BLOOD PRESSURE >= 140 MM HG: ICD-10-PCS | Mod: CPTII,S$GLB,, | Performed by: PHYSICIAN ASSISTANT

## 2019-10-10 NOTE — PROGRESS NOTES
"Subjective:      Patient ID: Denise Fry is a 49 y.o. female.    Chief Complaint: Annual Exam (wellness exam, and wants to discuss blood pressure)    Patient is new to me, here today for wellness exam   Patient's current concerns include BP   At last visit w Dr. Chaidez was started on Lexapro d/t anxiety, helping significantly   139/86-87 at home and feels like it slowly increasing, family history of HTN and dad had a stroke last week  Admits she has gained weight recently (10lbs in 1 yr), admits she has made poor lifestyle choices   Last mammogram May 2018, last pap April 2016    Review of Systems   Constitutional: Negative for chills, diaphoresis and fever.   HENT: Negative for congestion, rhinorrhea and sore throat.    Eyes: Negative for visual disturbance.   Respiratory: Negative for cough, shortness of breath and wheezing.    Gastrointestinal: Negative for abdominal pain, constipation, diarrhea, nausea and vomiting.   Genitourinary: Negative for dysuria, frequency and hematuria.   Skin: Negative for rash.   Neurological: Negative for dizziness, light-headedness and headaches.       Objective:   BP (!) 140/80 (BP Location: Left arm, Patient Position: Sitting)   Pulse 67   Temp 98.5 °F (36.9 °C) (Oral)   Ht 5' 2" (1.575 m)   Wt 83.3 kg (183 lb 10.3 oz)   LMP 10/03/2019   SpO2 97%   BMI 33.59 kg/m²   Physical Exam   Constitutional: She is oriented to person, place, and time. She appears well-developed and well-nourished. She does not appear ill. No distress.   HENT:   Head: Normocephalic and atraumatic.   Right Ear: Hearing, tympanic membrane, external ear and ear canal normal.   Left Ear: Hearing, tympanic membrane, external ear and ear canal normal.   Nose: Nose normal. No mucosal edema.   Mouth/Throat: Uvula is midline and oropharynx is clear and moist. No posterior oropharyngeal erythema.   Neck: Trachea normal. No thyromegaly present.   Cardiovascular: Normal rate, regular rhythm and normal heart " sounds.   No murmur heard.  Pulmonary/Chest: Effort normal and breath sounds normal. No respiratory distress. She has no decreased breath sounds. She has no wheezes. She has no rhonchi. She has no rales.   Abdominal: Soft. Normal appearance and bowel sounds are normal. She exhibits no distension. There is no tenderness.   Neurological: She is alert and oriented to person, place, and time. Gait normal.   Skin: Skin is warm, dry and intact. No rash noted.   Psychiatric: She has a normal mood and affect. Her speech is normal and behavior is normal. Thought content normal. Cognition and memory are normal.     Assessment:      1. Wellness examination    2. Anxiety    3. Essential hypertension    4. Gastroesophageal reflux disease without esophagitis    5. Primary insomnia    6. Screening for thyroid disorder    7. Screening for breast cancer       Plan:   Wellness examination  -     CBC auto differential; Future; Expected date: 10/10/2019    Anxiety   Managed well on Lexapro 10mg    Essential hypertension   Monitor at home, 1-2wk nurse visit for BP recheck     Gastroesophageal reflux disease without esophagitis   Currently on Prilosec     Primary insomnia   Prescribed ambien    Screening for thyroid disorder  -     TSH; Future; Expected date: 10/10/2019    Screening for breast cancer  -     Mammo Digital Diagnostic Bilateral; Future; Expected date: 10/10/2019    Patient will complete PAP at next 6mth visit with Dr. Chaidez    Discussed worsening signs/symptoms and when to return to clinic or go to ED.   Patient expresses understanding and agrees with treatment plan.

## 2019-10-24 ENCOUNTER — CLINICAL SUPPORT (OUTPATIENT)
Dept: FAMILY MEDICINE | Facility: CLINIC | Age: 49
End: 2019-10-24
Payer: COMMERCIAL

## 2019-10-24 DIAGNOSIS — I10 ESSENTIAL HYPERTENSION: Primary | ICD-10-CM

## 2019-10-24 PROCEDURE — 99212 PR OFFICE/OUTPT VISIT, EST, LEVL II, 10-19 MIN: ICD-10-PCS | Mod: S$GLB,,, | Performed by: EMERGENCY MEDICINE

## 2019-10-24 PROCEDURE — 99212 OFFICE O/P EST SF 10 MIN: CPT | Mod: S$GLB,,, | Performed by: EMERGENCY MEDICINE

## 2019-10-24 RX ORDER — LOSARTAN POTASSIUM 25 MG/1
25 TABLET ORAL DAILY
Qty: 30 TABLET | Refills: 1 | Status: SHIPPED | OUTPATIENT
Start: 2019-10-24 | End: 2019-11-19 | Stop reason: SDUPTHER

## 2019-10-24 NOTE — PROGRESS NOTES
Spoke with patient regarding elevated BP.  Do feel we should add on medication at this time as patient is already on max dosage of current BP meds.  Will send in new Rx to begin.  Continue to monitor BP.  F/u 1mth.  Patient expresses understanding.

## 2019-10-24 NOTE — PROGRESS NOTES
Denise Fry 49 y.o. female is here today for Blood Pressure check.   History of HTN yes.    Review of patient's allergies indicates:   Allergen Reactions    Codeine      Creatinine   Date Value Ref Range Status   01/03/2019 0.8 0.5 - 1.4 mg/dL Final     Sodium   Date Value Ref Range Status   01/03/2019 139 136 - 145 mmol/L Final     Potassium   Date Value Ref Range Status   01/03/2019 3.9 3.5 - 5.1 mmol/L Final   ]  Patient verifies taking blood pressure medications on a regular basis at the same time of the day.     Current Outpatient Medications:     bisoprolol-hydrochlorothiazide (ZIAC) 10-6.25 mg per tablet, TAKE 1 TABLET DAILY, Disp: 90 tablet, Rfl: 1    escitalopram oxalate (LEXAPRO) 10 MG tablet, Take 1 tablet (10 mg total) by mouth once daily., Disp: 90 tablet, Rfl: 1    fexofenadine-pseudoephedrine (ALLEGRA-D 24) 180-240 mg per 24 hr tablet, Take 1 tablet by mouth once daily., Disp: , Rfl:     omeprazole (PRILOSEC) 40 MG capsule, TAKE 1 CAPSULE DAILY, Disp: 90 capsule, Rfl: 1    valACYclovir (VALTREX) 500 MG tablet, TAKE 1 TABLET TWICE A DAY, Disp: 90 tablet, Rfl: 8    zolpidem (AMBIEN) 10 mg Tab, Take 1 tablet (10 mg total) by mouth nightly as needed., Disp: 90 tablet, Rfl: 1  Does patient have record of home blood pressure readings yes. Readings have been averaging 160//80.   Last dose of blood pressure medication was taken at 7 am.  Patient is asymptomatic.   Complains of elevated readings at home but no underlying symptoms.       170/90   .    Blood pressure reading after 15 minutes was 140/90, Pulse 62.  LIVE Good notified.

## 2019-10-24 NOTE — PATIENT INSTRUCTIONS
Saw pt today for a bp check in nurses visit, patient took bp medication at 7am. First reading was 170/90, then 15 minutes later reading was 140/90. Sent chart to provider for review.

## 2019-11-13 ENCOUNTER — HOSPITAL ENCOUNTER (OUTPATIENT)
Dept: RADIOLOGY | Facility: HOSPITAL | Age: 49
Discharge: HOME | End: 2019-11-13
Attending: PHYSICIAN ASSISTANT
Payer: COMMERCIAL

## 2019-11-13 DIAGNOSIS — Z12.31 BREAST CANCER SCREENING BY MAMMOGRAM: ICD-10-CM

## 2019-11-13 DIAGNOSIS — Z12.39 SCREENING FOR BREAST CANCER: ICD-10-CM

## 2019-11-13 PROCEDURE — 77067 SCR MAMMO BI INCL CAD: CPT | Mod: 26,,, | Performed by: RADIOLOGY

## 2019-11-13 PROCEDURE — 77063 MAMMO DIGITAL SCREENING BILAT WITH TOMOSYNTHESIS_CAD: ICD-10-PCS | Mod: 26,,, | Performed by: RADIOLOGY

## 2019-11-13 PROCEDURE — 77063 BREAST TOMOSYNTHESIS BI: CPT | Mod: 26,,, | Performed by: RADIOLOGY

## 2019-11-13 PROCEDURE — 77067 SCR MAMMO BI INCL CAD: CPT | Mod: TC,PO

## 2019-11-13 PROCEDURE — 77067 MAMMO DIGITAL SCREENING BILAT WITH TOMOSYNTHESIS_CAD: ICD-10-PCS | Mod: 26,,, | Performed by: RADIOLOGY

## 2019-11-19 ENCOUNTER — OFFICE VISIT (OUTPATIENT)
Dept: FAMILY MEDICINE | Facility: CLINIC | Age: 49
End: 2019-11-19
Payer: COMMERCIAL

## 2019-11-19 ENCOUNTER — LAB VISIT (OUTPATIENT)
Dept: LAB | Facility: HOSPITAL | Age: 49
End: 2019-11-19
Attending: PHYSICIAN ASSISTANT
Payer: COMMERCIAL

## 2019-11-19 VITALS
TEMPERATURE: 99 F | DIASTOLIC BLOOD PRESSURE: 80 MMHG | SYSTOLIC BLOOD PRESSURE: 130 MMHG | HEART RATE: 69 BPM | BODY MASS INDEX: 33.84 KG/M2 | OXYGEN SATURATION: 95 % | WEIGHT: 183.88 LBS | HEIGHT: 62 IN

## 2019-11-19 DIAGNOSIS — F51.01 PRIMARY INSOMNIA: ICD-10-CM

## 2019-11-19 DIAGNOSIS — I10 ESSENTIAL HYPERTENSION: ICD-10-CM

## 2019-11-19 DIAGNOSIS — I10 ESSENTIAL HYPERTENSION: Primary | ICD-10-CM

## 2019-11-19 DIAGNOSIS — F41.9 ANXIETY: ICD-10-CM

## 2019-11-19 LAB
ALBUMIN SERPL BCP-MCNC: 4.2 G/DL (ref 3.5–5.2)
ALP SERPL-CCNC: 72 U/L (ref 55–135)
ALT SERPL W/O P-5'-P-CCNC: 27 U/L (ref 10–44)
ANION GAP SERPL CALC-SCNC: 9 MMOL/L (ref 8–16)
AST SERPL-CCNC: 22 U/L (ref 10–40)
BILIRUB SERPL-MCNC: 0.8 MG/DL (ref 0.1–1)
BUN SERPL-MCNC: 13 MG/DL (ref 6–20)
CALCIUM SERPL-MCNC: 10.3 MG/DL (ref 8.7–10.5)
CHLORIDE SERPL-SCNC: 104 MMOL/L (ref 95–110)
CO2 SERPL-SCNC: 28 MMOL/L (ref 23–29)
CREAT SERPL-MCNC: 0.8 MG/DL (ref 0.5–1.4)
EST. GFR  (AFRICAN AMERICAN): >60 ML/MIN/1.73 M^2
EST. GFR  (NON AFRICAN AMERICAN): >60 ML/MIN/1.73 M^2
GLUCOSE SERPL-MCNC: 112 MG/DL (ref 70–110)
POTASSIUM SERPL-SCNC: 4.2 MMOL/L (ref 3.5–5.1)
PROT SERPL-MCNC: 7.2 G/DL (ref 6–8.4)
SODIUM SERPL-SCNC: 141 MMOL/L (ref 136–145)

## 2019-11-19 PROCEDURE — 3008F BODY MASS INDEX DOCD: CPT | Mod: CPTII,S$GLB,, | Performed by: PHYSICIAN ASSISTANT

## 2019-11-19 PROCEDURE — 80053 COMPREHEN METABOLIC PANEL: CPT | Mod: PO

## 2019-11-19 PROCEDURE — 3079F PR MOST RECENT DIASTOLIC BLOOD PRESSURE 80-89 MM HG: ICD-10-PCS | Mod: CPTII,S$GLB,, | Performed by: PHYSICIAN ASSISTANT

## 2019-11-19 PROCEDURE — 3075F PR MOST RECENT SYSTOLIC BLOOD PRESS GE 130-139MM HG: ICD-10-PCS | Mod: CPTII,S$GLB,, | Performed by: PHYSICIAN ASSISTANT

## 2019-11-19 PROCEDURE — 3075F SYST BP GE 130 - 139MM HG: CPT | Mod: CPTII,S$GLB,, | Performed by: PHYSICIAN ASSISTANT

## 2019-11-19 PROCEDURE — 99999 PR PBB SHADOW E&M-EST. PATIENT-LVL III: ICD-10-PCS | Mod: PBBFAC,,, | Performed by: PHYSICIAN ASSISTANT

## 2019-11-19 PROCEDURE — 3008F PR BODY MASS INDEX (BMI) DOCUMENTED: ICD-10-PCS | Mod: CPTII,S$GLB,, | Performed by: PHYSICIAN ASSISTANT

## 2019-11-19 PROCEDURE — 99214 PR OFFICE/OUTPT VISIT, EST, LEVL IV, 30-39 MIN: ICD-10-PCS | Mod: S$GLB,,, | Performed by: PHYSICIAN ASSISTANT

## 2019-11-19 PROCEDURE — 36415 COLL VENOUS BLD VENIPUNCTURE: CPT | Mod: PO

## 2019-11-19 PROCEDURE — 99999 PR PBB SHADOW E&M-EST. PATIENT-LVL III: CPT | Mod: PBBFAC,,, | Performed by: PHYSICIAN ASSISTANT

## 2019-11-19 PROCEDURE — 99214 OFFICE O/P EST MOD 30 MIN: CPT | Mod: S$GLB,,, | Performed by: PHYSICIAN ASSISTANT

## 2019-11-19 PROCEDURE — 3079F DIAST BP 80-89 MM HG: CPT | Mod: CPTII,S$GLB,, | Performed by: PHYSICIAN ASSISTANT

## 2019-11-19 RX ORDER — LOSARTAN POTASSIUM 25 MG/1
25 TABLET ORAL DAILY
Qty: 90 TABLET | Refills: 1 | Status: SHIPPED | OUTPATIENT
Start: 2019-11-19 | End: 2019-11-19 | Stop reason: SDUPTHER

## 2019-11-19 RX ORDER — ESCITALOPRAM OXALATE 10 MG/1
10 TABLET ORAL DAILY
Qty: 90 TABLET | Refills: 1 | Status: SHIPPED | OUTPATIENT
Start: 2019-11-19 | End: 2019-11-19 | Stop reason: SDUPTHER

## 2019-11-19 RX ORDER — BISOPROLOL FUMARATE AND HYDROCHLOROTHIAZIDE 10; 6.25 MG/1; MG/1
1 TABLET ORAL DAILY
Qty: 90 TABLET | Refills: 1 | Status: SHIPPED | OUTPATIENT
Start: 2019-11-19 | End: 2020-04-20 | Stop reason: SDUPTHER

## 2019-11-19 RX ORDER — ESCITALOPRAM OXALATE 10 MG/1
10 TABLET ORAL DAILY
Qty: 90 TABLET | Refills: 1 | Status: SHIPPED | OUTPATIENT
Start: 2019-11-19 | End: 2020-04-20 | Stop reason: SDUPTHER

## 2019-11-19 RX ORDER — ZOLPIDEM TARTRATE 10 MG/1
10 TABLET ORAL NIGHTLY PRN
Qty: 90 TABLET | Refills: 1 | Status: SHIPPED | OUTPATIENT
Start: 2019-11-19 | End: 2020-04-20 | Stop reason: SDUPTHER

## 2019-11-19 RX ORDER — BISOPROLOL FUMARATE AND HYDROCHLOROTHIAZIDE 10; 6.25 MG/1; MG/1
1 TABLET ORAL DAILY
Qty: 90 TABLET | Refills: 1 | Status: SHIPPED | OUTPATIENT
Start: 2019-11-19 | End: 2019-11-19 | Stop reason: SDUPTHER

## 2019-11-19 RX ORDER — LOSARTAN POTASSIUM 25 MG/1
25 TABLET ORAL DAILY
Qty: 90 TABLET | Refills: 1 | Status: SHIPPED | OUTPATIENT
Start: 2019-11-19 | End: 2019-11-20 | Stop reason: SDUPTHER

## 2019-11-19 NOTE — PROGRESS NOTES
"Subjective:      Patient ID: Denise Fry is a 49 y.o. female.    Chief Complaint: Hypertension (bp follow up) and Medication Refill    Patient is new to me, here today for 1mth follow up for BP and medication refill   Last seen by myself on 10/10, started on losartan 25mg, also currently taking Ziac 10-6.25  Patient reports she is feeling much better and her BP has been well controlled since adding on losartan 25mg (patient's log of home readings look good)    Review of Systems   Constitutional: Negative for chills, diaphoresis and fever.   HENT: Negative for congestion, rhinorrhea and sore throat.    Eyes: Negative for visual disturbance.   Respiratory: Negative for cough, shortness of breath and wheezing.    Gastrointestinal: Negative for abdominal pain, constipation, diarrhea, nausea and vomiting.   Skin: Negative for rash.   Neurological: Negative for dizziness, weakness, light-headedness and headaches.   Psychiatric/Behavioral: Negative for confusion.       Objective:   /80 (BP Location: Left arm, Patient Position: Sitting)   Pulse 69   Temp 98.5 °F (36.9 °C) (Oral)   Ht 5' 2" (1.575 m)   Wt 83.4 kg (183 lb 13.8 oz)   SpO2 95%   BMI 33.63 kg/m²   Physical Exam   Constitutional: She is oriented to person, place, and time. She appears well-developed and well-nourished. She does not appear ill. No distress.   HENT:   Head: Normocephalic and atraumatic.   Cardiovascular: Normal rate, regular rhythm and normal heart sounds.   No murmur heard.  Pulmonary/Chest: Effort normal and breath sounds normal. No respiratory distress. She has no decreased breath sounds. She has no wheezes. She has no rhonchi. She has no rales.   Neurological: She is alert and oriented to person, place, and time.   Skin: Skin is warm, dry and intact. No rash noted. No erythema.   Psychiatric: She has a normal mood and affect. Her speech is normal and behavior is normal. Thought content normal.     Assessment:      1. Essential " hypertension    2. Anxiety       Plan:   Essential hypertension  -     losartan (COZAAR) 25 MG tablet; Take 1 tablet (25 mg total) by mouth once daily.  Dispense: 90 tablet; Refill: 1  -     bisoprolol-hydrochlorothiazide (ZIAC) 10-6.25 mg per tablet; Take 1 tablet by mouth once daily.  Dispense: 90 tablet; Refill: 1  -     Comprehensive metabolic panel; Future; Expected date: 11/19/2019    Anxiety  -     escitalopram oxalate (LEXAPRO) 10 MG tablet; Take 1 tablet (10 mg total) by mouth once daily.  Dispense: 90 tablet; Refill: 1    Monitor BP weekly, return to clinic prn   6mth follow up w Dr. Chaidez     Discussed worsening signs/symptoms and when to return to clinic or go to ED.   Patient expresses understanding and agrees with treatment plan.

## 2019-11-20 NOTE — TELEPHONE ENCOUNTER
----- Message from Mansoor Mcintyre sent at 11/20/2019  4:52 PM CST -----  Contact: pt  Type:  RX Refill Request    Who Called:  pt  Refill or New Rx:  refill  RX Name and Strength:  losartan (COZAAR) 25 MG tablet  How is the patient currently taking it? (ex. 1XDay):   Take 1 tablet (25 mg total) by mouth once daily  Is this a 30 day or 90 day RX:  30  Preferred Pharmacy with phone number:      [id number] - [place] [address] [address] [place] [address] [address]  Counts include 234 beds at the [facility] [address] [place] [id number]  Phone: [phone] Fax: [phone]      Local or Mail Order:  local  Ordering Provider:  Alfredito  Best Call Back Number:  [phone]  Additional Information:  Needs a 30 day supply due to pt going out of town and will not be home when [facility] delivers the medicaiton

## 2019-11-21 RX ORDER — LOSARTAN POTASSIUM 25 MG/1
25 TABLET ORAL DAILY
Qty: 30 TABLET | Refills: 0 | Status: SHIPPED | OUTPATIENT
Start: 2019-11-21 | End: 2020-04-20 | Stop reason: SDUPTHER

## 2019-11-25 ENCOUNTER — TELEPHONE (OUTPATIENT)
Dept: FAMILY MEDICINE | Facility: CLINIC | Age: 49
End: 2019-11-25

## 2019-11-25 ENCOUNTER — TELEPHONE (OUTPATIENT)
Dept: OTOLARYNGOLOGY | Facility: CLINIC | Age: 49
End: 2019-11-25

## 2019-11-25 NOTE — TELEPHONE ENCOUNTER
----- Message from Raj Gillis sent at 11/25/2019 10:06 AM CST -----  Contact: same  Patient called in and stated office called her earlier today & must have been about her mammo & lab results.    Patient call back number is [phone]

## 2019-11-25 NOTE — TELEPHONE ENCOUNTER
----- Message from Paula Kenny sent at 11/25/2019 10:17 AM CST -----  Contact: patient   Please call above patient at [phone]-need to speak with the nurse for sooner appointment thanks.

## 2019-12-02 ENCOUNTER — TELEPHONE (OUTPATIENT)
Dept: OTOLARYNGOLOGY | Facility: CLINIC | Age: 49
End: 2019-12-02

## 2019-12-02 ENCOUNTER — OFFICE VISIT (OUTPATIENT)
Dept: OTOLARYNGOLOGY | Facility: CLINIC | Age: 49
End: 2019-12-02
Payer: COMMERCIAL

## 2019-12-02 VITALS
HEART RATE: 70 BPM | DIASTOLIC BLOOD PRESSURE: 81 MMHG | WEIGHT: 182.31 LBS | SYSTOLIC BLOOD PRESSURE: 185 MMHG | TEMPERATURE: 98 F | BODY MASS INDEX: 33.35 KG/M2

## 2019-12-02 DIAGNOSIS — J39.8 TRACHEAL STENOSIS: ICD-10-CM

## 2019-12-02 DIAGNOSIS — J38.6 SUBGLOTTIC STENOSIS: Primary | ICD-10-CM

## 2019-12-02 PROCEDURE — 3008F BODY MASS INDEX DOCD: CPT | Mod: CPTII,S$GLB,, | Performed by: OTOLARYNGOLOGY

## 2019-12-02 PROCEDURE — 3079F PR MOST RECENT DIASTOLIC BLOOD PRESSURE 80-89 MM HG: ICD-10-PCS | Mod: CPTII,S$GLB,, | Performed by: OTOLARYNGOLOGY

## 2019-12-02 PROCEDURE — 99999 PR PBB SHADOW E&M-EST. PATIENT-LVL IV: CPT | Mod: PBBFAC,,, | Performed by: OTOLARYNGOLOGY

## 2019-12-02 PROCEDURE — 3079F DIAST BP 80-89 MM HG: CPT | Mod: CPTII,S$GLB,, | Performed by: OTOLARYNGOLOGY

## 2019-12-02 PROCEDURE — 3077F SYST BP >= 140 MM HG: CPT | Mod: CPTII,S$GLB,, | Performed by: OTOLARYNGOLOGY

## 2019-12-02 PROCEDURE — 3077F PR MOST RECENT SYSTOLIC BLOOD PRESSURE >= 140 MM HG: ICD-10-PCS | Mod: CPTII,S$GLB,, | Performed by: OTOLARYNGOLOGY

## 2019-12-02 PROCEDURE — 99999 PR PBB SHADOW E&M-EST. PATIENT-LVL IV: ICD-10-PCS | Mod: PBBFAC,,, | Performed by: OTOLARYNGOLOGY

## 2019-12-02 PROCEDURE — 31579 PR LARYNGOSCOPY, FLEX/RIGID TELESCOPIC, W/STROBOSCOPY: ICD-10-PCS | Mod: S$GLB,,, | Performed by: OTOLARYNGOLOGY

## 2019-12-02 PROCEDURE — 99213 PR OFFICE/OUTPT VISIT, EST, LEVL III, 20-29 MIN: ICD-10-PCS | Mod: 25,S$GLB,, | Performed by: OTOLARYNGOLOGY

## 2019-12-02 PROCEDURE — 3008F PR BODY MASS INDEX (BMI) DOCUMENTED: ICD-10-PCS | Mod: CPTII,S$GLB,, | Performed by: OTOLARYNGOLOGY

## 2019-12-02 PROCEDURE — 99213 OFFICE O/P EST LOW 20 MIN: CPT | Mod: 25,S$GLB,, | Performed by: OTOLARYNGOLOGY

## 2019-12-02 PROCEDURE — 31579 LARYNGOSCOPY TELESCOPIC: CPT | Mod: S$GLB,,, | Performed by: OTOLARYNGOLOGY

## 2019-12-02 RX ORDER — LIDOCAINE HYDROCHLORIDE 10 MG/ML
1 INJECTION, SOLUTION EPIDURAL; INFILTRATION; INTRACAUDAL; PERINEURAL ONCE
Status: CANCELLED | OUTPATIENT
Start: 2019-12-02 | End: 2019-12-02

## 2019-12-02 RX ORDER — DEXAMETHASONE SODIUM PHOSPHATE 4 MG/ML
8 INJECTION, SOLUTION INTRA-ARTICULAR; INTRALESIONAL; INTRAMUSCULAR; INTRAVENOUS; SOFT TISSUE
Status: CANCELLED | OUTPATIENT
Start: 2019-12-02

## 2019-12-02 NOTE — H&P (VIEW-ONLY)
[facility]  Department of Otorhinolaryngology and Communication Sciences    Subjective:      Denise Fry is a 49 y.o. female who presents for follow-up. She has idiopathic laryngotracheal stenosis.    TREATMENT HISTORY:  11/13/2018 - SML/TB; CO2 laser; dilation 12 mm; kenalog  4/2019-7/2019 - office steroid injections    She has noted worsening of her breathing within the last 3 weeks. Then she was in [place] last week. Allergic to corn, which was in the air up there. Her breathing has been louder. She has felt a little pre-syncopal from time to time. Voice and swallowing function have been fine.    The patient's medications, allergies, past medical, surgical, social and family histories were reviewed and updated as appropriate.    A detailed review of systems was obtained with pertinent positives as per the above HPI, and otherwise negative.      Objective:     BP (!) 185/81   Pulse 70   Temp 97.7 °F (36.5 °C)   Wt 82.7 kg (182 lb 5.1 oz)   BMI 33.35 kg/m²      Constitutional: comfortable, well dressed  Psychiatric: appropriate affect  Respiratory: comfortably breathing, symmetric chest rise, mild coarse biphasic stridor (inspiration>expiration)   Voice: normal for age and gender  Head: normocephalic  Eyes: conjunctivae and sclerae clear  Indirect laryngoscopy is limited due to gag    Procedure  Flexible Laryngoscopy (04273): Laryngoscopy is indicated for assessment of upper aerodigestive structure and function. This was carried out transnasally with a distal chip videoendoscope. After verbal consent was obtained, the patient was positioned and the nose was topically decongested with 1% phenylephrine and topically anesthetized with 4% lidocaine. The endoscope was passed through the most patent nasal cavity and positioned to image the nasopharynx, larynx, and hypopharynx in detail. The following features were examined: nasopharyngeal, laryngeal, hypopharyngeal masses; velopharyngeal strength, closure,  and symmetry of motion; vocal fold range and symmetry of motion; laryngeal mucosal edema, erythema, inflammation, and hydration; salivary pooling; and gross laryngeal sensation. The equipment was removed. The patient tolerated the procedure well without complication. All findings were normal except:  - circumferential stenosis beginning at posterior glottis, tracking through subglottis into proximal trachea; mild interval contracture of airway walls  - right vocal fold with slightly limited lateral excursion and slightly elongated      Assessment:     Denise Fry is a 49 y.o. female with idiopathic laryngotracheal stenosis.     Plan:     I recommended returning to the OR for endoscopy, possible laser incision/excision, possible dilation, possible steroid injection. I reviewed with her the risks/benefits. She wishes to proceed. Since her airway symptoms are worsening, even since last week, I recommended addressing this urgently tomorrow. We will add her case on. As long as she is doing well in recovery, same day discharge is anticipated.    All questions were answered and the patient would like to proceed.  We will arrange this in the coming weeks.    August Mendoza M.D.  [facility]  Department of Otorhinolaryngology and Communication Sciences

## 2019-12-02 NOTE — PROGRESS NOTES
[facility]  Department of Otorhinolaryngology and Communication Sciences    Subjective:      Denise Fry is a 49 y.o. female who presents for follow-up. She has idiopathic laryngotracheal stenosis.    TREATMENT HISTORY:  11/13/2018 - SML/TB; CO2 laser; dilation 12 mm; kenalog  4/2019-7/2019 - office steroid injections    She has noted worsening of her breathing within the last 3 weeks. Then she was in [place] last week. Allergic to corn, which was in the air up there. Her breathing has been louder. She has felt a little pre-syncopal from time to time. Voice and swallowing function have been fine.    The patient's medications, allergies, past medical, surgical, social and family histories were reviewed and updated as appropriate.    A detailed review of systems was obtained with pertinent positives as per the above HPI, and otherwise negative.      Objective:     BP (!) 185/81   Pulse 70   Temp 97.7 °F (36.5 °C)   Wt 82.7 kg (182 lb 5.1 oz)   BMI 33.35 kg/m²      Constitutional: comfortable, well dressed  Psychiatric: appropriate affect  Respiratory: comfortably breathing, symmetric chest rise, mild coarse biphasic stridor (inspiration>expiration)   Voice: normal for age and gender  Head: normocephalic  Eyes: conjunctivae and sclerae clear  Indirect laryngoscopy is limited due to gag    Procedure  Flexible Laryngoscopy (76611): Laryngoscopy is indicated for assessment of upper aerodigestive structure and function. This was carried out transnasally with a distal chip videoendoscope. After verbal consent was obtained, the patient was positioned and the nose was topically decongested with 1% phenylephrine and topically anesthetized with 4% lidocaine. The endoscope was passed through the most patent nasal cavity and positioned to image the nasopharynx, larynx, and hypopharynx in detail. The following features were examined: nasopharyngeal, laryngeal, hypopharyngeal masses; velopharyngeal strength, closure,  and symmetry of motion; vocal fold range and symmetry of motion; laryngeal mucosal edema, erythema, inflammation, and hydration; salivary pooling; and gross laryngeal sensation. The equipment was removed. The patient tolerated the procedure well without complication. All findings were normal except:  - circumferential stenosis beginning at posterior glottis, tracking through subglottis into proximal trachea; mild interval contracture of airway walls  - right vocal fold with slightly limited lateral excursion and slightly elongated      Assessment:     Denise Fry is a 49 y.o. female with idiopathic laryngotracheal stenosis.     Plan:     I recommended returning to the OR for endoscopy, possible laser incision/excision, possible dilation, possible steroid injection. I reviewed with her the risks/benefits. She wishes to proceed. Since her airway symptoms are worsening, even since last week, I recommended addressing this urgently tomorrow. We will add her case on. As long as she is doing well in recovery, same day discharge is anticipated.    All questions were answered and the patient would like to proceed.  We will arrange this in the coming weeks.    August Mendoza M.D.  [facility]  Department of Otorhinolaryngology and Communication Sciences

## 2019-12-03 ENCOUNTER — HOSPITAL ENCOUNTER (OUTPATIENT)
Facility: HOSPITAL | Age: 49
Discharge: HOME | End: 2019-12-03
Attending: OTOLARYNGOLOGY | Admitting: OTOLARYNGOLOGY
Payer: COMMERCIAL

## 2019-12-03 ENCOUNTER — ANESTHESIA (OUTPATIENT)
Dept: SURGERY | Facility: HOSPITAL | Age: 49
End: 2019-12-03
Payer: COMMERCIAL

## 2019-12-03 ENCOUNTER — ANESTHESIA EVENT (OUTPATIENT)
Dept: SURGERY | Facility: HOSPITAL | Age: 49
End: 2019-12-03
Payer: COMMERCIAL

## 2019-12-03 VITALS
OXYGEN SATURATION: 98 % | HEIGHT: 62 IN | DIASTOLIC BLOOD PRESSURE: 76 MMHG | TEMPERATURE: 99 F | SYSTOLIC BLOOD PRESSURE: 134 MMHG | RESPIRATION RATE: 15 BRPM | BODY MASS INDEX: 33.49 KG/M2 | HEART RATE: 80 BPM | WEIGHT: 182 LBS

## 2019-12-03 DIAGNOSIS — J38.6 SUBGLOTTIC STENOSIS: Primary | ICD-10-CM

## 2019-12-03 DIAGNOSIS — J39.8 TRACHEAL STENOSIS: ICD-10-CM

## 2019-12-03 LAB
B-HCG UR QL: NEGATIVE
CTP QC/QA: YES

## 2019-12-03 PROCEDURE — 36000709 HC OR TIME LEV III EA ADD 15 MIN: Performed by: OTOLARYNGOLOGY

## 2019-12-03 PROCEDURE — 31641 PR BRONCHOSCOPY,RX STENOSIS: ICD-10-PCS | Mod: ,,, | Performed by: OTOLARYNGOLOGY

## 2019-12-03 PROCEDURE — C1726 CATH, BAL DIL, NON-VASCULAR: HCPCS | Performed by: OTOLARYNGOLOGY

## 2019-12-03 PROCEDURE — 63600175 PHARM REV CODE 636 W HCPCS: Performed by: NURSE ANESTHETIST, CERTIFIED REGISTERED

## 2019-12-03 PROCEDURE — 31571 PR LARYNGOSCOPY,DIRECT,SCOPE,INJ CORDS: ICD-10-PCS | Mod: 51,,, | Performed by: OTOLARYNGOLOGY

## 2019-12-03 PROCEDURE — D9220A PRA ANESTHESIA: ICD-10-PCS | Mod: CRNA,,, | Performed by: NURSE ANESTHETIST, CERTIFIED REGISTERED

## 2019-12-03 PROCEDURE — D9220A PRA ANESTHESIA: ICD-10-PCS | Mod: ANES,,, | Performed by: ANESTHESIOLOGY

## 2019-12-03 PROCEDURE — 63600175 PHARM REV CODE 636 W HCPCS: Performed by: OTOLARYNGOLOGY

## 2019-12-03 PROCEDURE — 25000003 PHARM REV CODE 250: Performed by: NURSE ANESTHETIST, CERTIFIED REGISTERED

## 2019-12-03 PROCEDURE — 25000003 PHARM REV CODE 250: Performed by: OTOLARYNGOLOGY

## 2019-12-03 PROCEDURE — 25000003 PHARM REV CODE 250: Performed by: STUDENT IN AN ORGANIZED HEALTH CARE EDUCATION/TRAINING PROGRAM

## 2019-12-03 PROCEDURE — 37000009 HC ANESTHESIA EA ADD 15 MINS: Performed by: OTOLARYNGOLOGY

## 2019-12-03 PROCEDURE — 31641 BRONCHOSCOPY TREAT BLOCKAGE: CPT | Mod: ,,, | Performed by: OTOLARYNGOLOGY

## 2019-12-03 PROCEDURE — 94761 N-INVAS EAR/PLS OXIMETRY MLT: CPT

## 2019-12-03 PROCEDURE — 81025 URINE PREGNANCY TEST: CPT | Performed by: OTOLARYNGOLOGY

## 2019-12-03 PROCEDURE — 36000708 HC OR TIME LEV III 1ST 15 MIN: Performed by: OTOLARYNGOLOGY

## 2019-12-03 PROCEDURE — 27201423 OPTIME MED/SURG SUP & DEVICES STERILE SUPPLY: Performed by: OTOLARYNGOLOGY

## 2019-12-03 PROCEDURE — 37000008 HC ANESTHESIA 1ST 15 MINUTES: Performed by: OTOLARYNGOLOGY

## 2019-12-03 PROCEDURE — 71000033 HC RECOVERY, INTIAL HOUR: Performed by: OTOLARYNGOLOGY

## 2019-12-03 PROCEDURE — 31529 LARYNGOSCOPY AND DILATION: CPT | Mod: 51,,, | Performed by: OTOLARYNGOLOGY

## 2019-12-03 PROCEDURE — 71000015 HC POSTOP RECOV 1ST HR: Performed by: OTOLARYNGOLOGY

## 2019-12-03 PROCEDURE — 31571 LARYNGOSCOP W/VC INJ + SCOPE: CPT | Mod: 51,,, | Performed by: OTOLARYNGOLOGY

## 2019-12-03 PROCEDURE — D9220A PRA ANESTHESIA: Mod: ANES,,, | Performed by: ANESTHESIOLOGY

## 2019-12-03 PROCEDURE — 31529 PR LARYNGOSCOPY DIRECT,W/DILAT, SUBSEQUENT: ICD-10-PCS | Mod: 51,,, | Performed by: OTOLARYNGOLOGY

## 2019-12-03 PROCEDURE — D9220A PRA ANESTHESIA: Mod: CRNA,,, | Performed by: NURSE ANESTHETIST, CERTIFIED REGISTERED

## 2019-12-03 RX ORDER — TRIAMCINOLONE ACETONIDE 40 MG/ML
INJECTION, SUSPENSION INTRA-ARTICULAR; INTRAMUSCULAR
Status: DISCONTINUED | OUTPATIENT
Start: 2019-12-03 | End: 2019-12-03 | Stop reason: HOSPADM

## 2019-12-03 RX ORDER — EPINEPHRINE 1 MG/ML
INJECTION, SOLUTION INTRACARDIAC; INTRAMUSCULAR; INTRAVENOUS; SUBCUTANEOUS
Status: DISCONTINUED | OUTPATIENT
Start: 2019-12-03 | End: 2019-12-03 | Stop reason: HOSPADM

## 2019-12-03 RX ORDER — SULFAMETHOXAZOLE AND TRIMETHOPRIM 800; 160 MG/1; MG/1
1 TABLET ORAL DAILY
Qty: 14 TABLET | Refills: 0 | Status: SHIPPED | OUTPATIENT
Start: 2019-12-03 | End: 2019-12-17

## 2019-12-03 RX ORDER — LIDOCAINE HYDROCHLORIDE 40 MG/ML
INJECTION, SOLUTION RETROBULBAR
Status: DISCONTINUED | OUTPATIENT
Start: 2019-12-03 | End: 2019-12-03 | Stop reason: HOSPADM

## 2019-12-03 RX ORDER — VECURONIUM BROMIDE FOR INJECTION 1 MG/ML
INJECTION, POWDER, LYOPHILIZED, FOR SOLUTION INTRAVENOUS
Status: DISCONTINUED | OUTPATIENT
Start: 2019-12-03 | End: 2019-12-03

## 2019-12-03 RX ORDER — OXYCODONE HYDROCHLORIDE 5 MG/1
10 TABLET ORAL EVERY 4 HOURS PRN
Status: DISCONTINUED | OUTPATIENT
Start: 2019-12-03 | End: 2019-12-03 | Stop reason: HOSPADM

## 2019-12-03 RX ORDER — ROCURONIUM BROMIDE 10 MG/ML
INJECTION, SOLUTION INTRAVENOUS
Status: DISCONTINUED | OUTPATIENT
Start: 2019-12-03 | End: 2019-12-03

## 2019-12-03 RX ORDER — SODIUM CHLORIDE 9 MG/ML
INJECTION, SOLUTION INTRAVENOUS CONTINUOUS
Status: DISCONTINUED | OUTPATIENT
Start: 2019-12-03 | End: 2019-12-03 | Stop reason: HOSPADM

## 2019-12-03 RX ORDER — ONDANSETRON 8 MG/1
8 TABLET, ORALLY DISINTEGRATING ORAL EVERY 6 HOURS PRN
Status: DISCONTINUED | OUTPATIENT
Start: 2019-12-03 | End: 2019-12-03 | Stop reason: HOSPADM

## 2019-12-03 RX ORDER — IBUPROFEN 600 MG/1
600 TABLET ORAL EVERY 6 HOURS PRN
COMMUNITY
Start: 2019-12-03 | End: 2020-09-18

## 2019-12-03 RX ORDER — PROPOFOL 10 MG/ML
VIAL (ML) INTRAVENOUS
Status: DISCONTINUED | OUTPATIENT
Start: 2019-12-03 | End: 2019-12-03

## 2019-12-03 RX ORDER — PROPOFOL 10 MG/ML
VIAL (ML) INTRAVENOUS CONTINUOUS PRN
Status: DISCONTINUED | OUTPATIENT
Start: 2019-12-03 | End: 2019-12-03

## 2019-12-03 RX ORDER — METOCLOPRAMIDE HYDROCHLORIDE 5 MG/ML
5 INJECTION INTRAMUSCULAR; INTRAVENOUS EVERY 6 HOURS PRN
Status: DISCONTINUED | OUTPATIENT
Start: 2019-12-03 | End: 2019-12-03 | Stop reason: HOSPADM

## 2019-12-03 RX ORDER — METHYLPREDNISOLONE 4 MG/1
TABLET ORAL
Qty: 1 PACKAGE | Refills: 0 | Status: SHIPPED | OUTPATIENT
Start: 2019-12-03 | End: 2019-12-24

## 2019-12-03 RX ORDER — ACETAMINOPHEN 325 MG/1
650 TABLET ORAL EVERY 4 HOURS PRN
Status: DISCONTINUED | OUTPATIENT
Start: 2019-12-03 | End: 2019-12-03 | Stop reason: HOSPADM

## 2019-12-03 RX ORDER — DEXAMETHASONE SODIUM PHOSPHATE 4 MG/ML
8 INJECTION, SOLUTION INTRA-ARTICULAR; INTRALESIONAL; INTRAMUSCULAR; INTRAVENOUS; SOFT TISSUE
Status: DISCONTINUED | OUTPATIENT
Start: 2019-12-03 | End: 2019-12-03 | Stop reason: HOSPADM

## 2019-12-03 RX ORDER — LABETALOL HYDROCHLORIDE 5 MG/ML
INJECTION, SOLUTION INTRAVENOUS
Status: DISCONTINUED | OUTPATIENT
Start: 2019-12-03 | End: 2019-12-03

## 2019-12-03 RX ORDER — MIDAZOLAM HYDROCHLORIDE 1 MG/ML
INJECTION, SOLUTION INTRAMUSCULAR; INTRAVENOUS
Status: DISCONTINUED | OUTPATIENT
Start: 2019-12-03 | End: 2019-12-03

## 2019-12-03 RX ORDER — ACETAMINOPHEN 325 MG/1
650 TABLET ORAL
COMMUNITY
Start: 2019-12-03 | End: 2020-09-18

## 2019-12-03 RX ORDER — HYDROCODONE BITARTRATE AND ACETAMINOPHEN 5; 325 MG/1; MG/1
1 TABLET ORAL EVERY 4 HOURS PRN
Status: DISCONTINUED | OUTPATIENT
Start: 2019-12-03 | End: 2019-12-03 | Stop reason: HOSPADM

## 2019-12-03 RX ORDER — LIDOCAINE HCL/PF 100 MG/5ML
SYRINGE (ML) INTRAVENOUS
Status: DISCONTINUED | OUTPATIENT
Start: 2019-12-03 | End: 2019-12-03

## 2019-12-03 RX ORDER — FENTANYL CITRATE 50 UG/ML
INJECTION, SOLUTION INTRAMUSCULAR; INTRAVENOUS
Status: DISCONTINUED | OUTPATIENT
Start: 2019-12-03 | End: 2019-12-03

## 2019-12-03 RX ORDER — KETAMINE HCL IN 0.9 % NACL 50 MG/5 ML
SYRINGE (ML) INTRAVENOUS
Status: DISCONTINUED | OUTPATIENT
Start: 2019-12-03 | End: 2019-12-03

## 2019-12-03 RX ORDER — LIDOCAINE HYDROCHLORIDE 10 MG/ML
1 INJECTION, SOLUTION EPIDURAL; INFILTRATION; INTRACAUDAL; PERINEURAL ONCE
Status: DISCONTINUED | OUTPATIENT
Start: 2019-12-03 | End: 2019-12-03 | Stop reason: HOSPADM

## 2019-12-03 RX ORDER — DEXAMETHASONE SODIUM PHOSPHATE 4 MG/ML
INJECTION, SOLUTION INTRA-ARTICULAR; INTRALESIONAL; INTRAMUSCULAR; INTRAVENOUS; SOFT TISSUE
Status: DISCONTINUED | OUTPATIENT
Start: 2019-12-03 | End: 2019-12-03

## 2019-12-03 RX ORDER — ONDANSETRON 2 MG/ML
INJECTION INTRAMUSCULAR; INTRAVENOUS
Status: DISCONTINUED | OUTPATIENT
Start: 2019-12-03 | End: 2019-12-03

## 2019-12-03 RX ADMIN — FENTANYL CITRATE 50 MCG: 50 INJECTION, SOLUTION INTRAMUSCULAR; INTRAVENOUS at 01:12

## 2019-12-03 RX ADMIN — LABETALOL HYDROCHLORIDE 10 MG: 5 INJECTION, SOLUTION INTRAVENOUS at 01:12

## 2019-12-03 RX ADMIN — ROCURONIUM BROMIDE 20 MG: 10 INJECTION, SOLUTION INTRAVENOUS at 01:12

## 2019-12-03 RX ADMIN — HYDROCODONE BITARTRATE AND ACETAMINOPHEN 1 TABLET: 5; 325 TABLET ORAL at 02:12

## 2019-12-03 RX ADMIN — VECURONIUM BROMIDE 2 MG: 1 INJECTION, POWDER, LYOPHILIZED, FOR SOLUTION INTRAVENOUS at 01:12

## 2019-12-03 RX ADMIN — PROPOFOL 180 MG: 10 INJECTION, EMULSION INTRAVENOUS at 01:12

## 2019-12-03 RX ADMIN — DEXAMETHASONE SODIUM PHOSPHATE 8 MG: 4 INJECTION, SOLUTION INTRAMUSCULAR; INTRAVENOUS at 01:12

## 2019-12-03 RX ADMIN — AMPICILLIN SODIUM AND SULBACTAM SODIUM 3 G: 2; 1 INJECTION, POWDER, FOR SOLUTION INTRAMUSCULAR; INTRAVENOUS at 01:12

## 2019-12-03 RX ADMIN — SUGAMMADEX 330 MG: 100 INJECTION, SOLUTION INTRAVENOUS at 02:12

## 2019-12-03 RX ADMIN — ROCURONIUM BROMIDE 30 MG: 10 INJECTION, SOLUTION INTRAVENOUS at 01:12

## 2019-12-03 RX ADMIN — VECURONIUM BROMIDE FOR INJECTION 2 MG: 1 INJECTION, POWDER, LYOPHILIZED, FOR SOLUTION INTRAVENOUS at 01:12

## 2019-12-03 RX ADMIN — MIDAZOLAM HYDROCHLORIDE 2 MG: 1 INJECTION, SOLUTION INTRAMUSCULAR; INTRAVENOUS at 01:12

## 2019-12-03 RX ADMIN — SODIUM CHLORIDE: 0.9 INJECTION, SOLUTION INTRAVENOUS at 01:12

## 2019-12-03 RX ADMIN — PROPOFOL 150 MCG/KG/MIN: 10 INJECTION, EMULSION INTRAVENOUS at 01:12

## 2019-12-03 RX ADMIN — Medication 20 MG: at 01:12

## 2019-12-03 RX ADMIN — LIDOCAINE HYDROCHLORIDE 80 MG: 20 INJECTION, SOLUTION INTRAVENOUS at 01:12

## 2019-12-03 RX ADMIN — Medication 30 MG: at 01:12

## 2019-12-03 RX ADMIN — PROPOFOL 20 MG: 10 INJECTION, EMULSION INTRAVENOUS at 01:12

## 2019-12-03 RX ADMIN — ONDANSETRON 4 MG: 2 INJECTION INTRAMUSCULAR; INTRAVENOUS at 02:12

## 2019-12-03 NOTE — BRIEF OP NOTE
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]    Procedure(s) (LRB):  Suspension microlaryngoscopy, CO2 laser excision, steroid injection, balloon dilation; JET VENTILATION (N/A)    Anesthesia: General    Description of the procedure:   Exposure with subglottiscope   Jet ventilation  Tracheal dilation x3  Excision of tracheal stenosis with CO2 laser    Findings: tracheal stenosis, most severe at most distal aspect    Estimated Blood Loss: 5-10cc         Specimens:   Specimen (12h ago, onward)    None          LENGTH OF SURGERY:   0 Hr 52 Min 37 Sec    Event Time In Time Out   In Facility 1113    In Pre-Procedure 1149    Pre-Procedure Complete 1323    Anesthesia Start 1322    Setup Start 1250    Setup Complete 1319    In Room 1327    Prep Start 1336    Procedure Prep Complete 1344    Procedure Start 1344    Procedure Closing 1410    Emergence 1407    Procedure Finish 1410    Out of Room 1419    In Recovery 1421      Discharge Note    SUMMARY     Admit Date: 12/3/2019    Discharge Date and Time:   2:26 PM    Hospital Course: Please see the preoperative H&P and other available documentation for full details related to history prior to this admission.  Briefly, pt presented to [facility] for elective outpatient procedure. Procedure, risks and benefits were discussed with patient. All questions were answered. Informed consent was obtained. Pt was taken to the OR and underwent the above procedures without  complications. Pt  was transferred to PACU in stable condition and discharged to home the same day.     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     bisoprolol-hydrochlorothiazide (ZIAC) 10-6.25 mg per tablet  Take 1 tablet by mouth once daily.     escitalopram oxalate (LEXAPRO) 10 MG tablet  Take 1 tablet (10 mg total) by mouth once daily.     fexofenadine-pseudoephedrine (ALLEGRA-D 24) 180-240 mg per 24 hr tablet  Take 1 tablet by mouth once daily.     ibuprofen (ADVIL,MOTRIN) 600 MG tablet  Take 1 tablet (600 mg total) by mouth every 6 (six) hours as needed for Pain.     losartan (COZAAR) 25 MG tablet  Take 1 tablet (25 mg total) by mouth once daily.     methylPREDNISolone (MEDROL DOSEPACK) 4 mg tablet  use as directed     omeprazole (PRILOSEC) 40 MG capsule  TAKE 1 CAPSULE DAILY     sulfamethoxazole-trimethoprim 800-160mg (BACTRIM DS) 800-160 mg Tab  Take 1 tablet by mouth once daily. for 14 days     valACYclovir (VALTREX) 500 MG tablet  TAKE 1 TABLET TWICE A DAY     zolpidem (AMBIEN) 10 mg Tab  Take 1 tablet (10 mg total) by mouth nightly as needed.         Discharge Procedure Orders   Diet general     Call MD for:  difficulty breathing, headache or visual disturbances     Call MD for:  redness, tenderness, or signs of infection (pain, swelling, redness, odor or green/yellow discharge around incision site)     Call MD for:  hives     Call MD for:   Order Comments: Persistent bleeding     No dressing needed     Activity as tolerated       Follow Up:   Follow-up Information     August Mendoza MD In 4 weeks.    Specialty:   Otolaryngology  Contact information:  [address]  [facility] [address]  [phone]

## 2019-12-03 NOTE — DISCHARGE INSTRUCTIONS
No voice restrictions  Go to ER if difficulty breathing or coughing up blood  Take full course of antibiotics and steroids as prescribed  Follow up with Dr. Mendoza in 1 month                                  Direct Laryngoscopy  Direct laryngoscopy is a procedure to look at the vocal cords. A laryngoscope is a rigid, hollow tube with a light attached. Using this tool, your healthcare provider can look behind your tongue and down your throat to your vocal cords. A tissue sample (biopsy) can be taken for study in a lab. Or a growth can be removed. Your healthcare provider can tell you more about your procedure depending on why its being done. This sheet gives you general information about what to expect.    Getting ready for the procedure  Prepare for the surgery as you have been instructed. Be sure to tell your healthcare provider about all medicines you take. This includes over-the-counter medicines. It also includes herbs and other supplements. You may need to stop taking some or all of them before surgery. Your healthcare provider will let you know. Also follow any directions youre given for not eating or drinking before surgery.  The day of the procedure  The procedure takes 30 to 60 minutes. You will likely go home the same day.  Before the procedure  Here is what to expect before the procedure begins:  · An IV line is put into a vein in your arm or hand. This line delivers fluids and medicines.  · You will be given medicine (anesthesia) to keep you pain free during surgery. This may be general anesthesia, which puts you in a state like deep sleep. Or you may have sedation, which makes you relaxed and drowsy. Local anesthesia may also be injected or sprayed into your throat to numb it.  During the procedure  Here is what to expect during the procedure:  · You will lie on your back on a table. The scope is put into your mouth and passed down into the throat.  · Your healthcare provider examines the larynx and  surrounding areas with the scope. If needed, a biopsy is done using small tools put through the scope.  · If a growth is found, tools can be put through the scope to remove it.  After the procedure  You will be taken to a room to wake up from the anesthesia. At first, your throat may be sore and scratchy. Your voice may be hoarse, making talking difficult. And it may be hard to swallow. You will receive medicine to control pain. When you are released to go home, have an adult family member or friend ready to drive you.  Recovering at home  Once home, follow any instructions you have been given. Be sure to:  · Take pain medicine as directed.  · Drink plenty of water as soon as you can swallow normally.  · Use throat lozenges as advised by your healthcare provider to help ease throat soreness.  · Rest your voice as instructed by your healthcare provider.  When to call your healthcare provider  After you get home, call the healthcare provider if you have any of the following:  · Chest pain or trouble breathing (call 911)  · Fever of 100.4°F (38°C) or higher, or as directed by your healthcare provider  · Problems swallowing that prevent you from eating or drinking  · Pain that does not go away even after taking pain medicine  · Severe nausea or vomiting  · Bloody vomit  · Cough that brings up blood   Follow-up  Within a few weeks, you will see your healthcare provider for a follow-up visit. During this visit, your provider will discuss the results of the procedure. Depending on what was found, you may need further evaluation and treatment.  Risks and possible complications   The risks of this procedure include:  · Bleeding  · Infection  · Gagging  · Vomiting  · Cuts in the mouth or throat  · Injury to the teeth  · Vocal cord injury  · Breathing problems  · Risks of anesthesia   Date Last Reviewed: 6/11/2015  © 7931-3748 [employer]. [address] [place] [address], [place] [address]. All rights reserved. This  information is not intended as a substitute for professional medical care. Always follow your healthcare professional's instructions.

## 2019-12-03 NOTE — ANESTHESIA PREPROCEDURE EVALUATION
Anesthesia Assessment: Preoperative EQUATION     Planned Procedure: Procedure(s) (LRB):  Suspension microlaryngoscopy, CO2 laser excision, steroid injection, balloon dilation.  JET VENTILATION. (N/A)  Requested Anesthesia Type:General  Surgeon: August Mendoza MD  Service: ENT  Known or anticipated Date of Surgery:11/13/2018     Surgeon notes: reviewed     Electronic QUestionnaire Assessment completed via nurse interview with patient.      NO AQ     Triage considerations:      The patient has no apparent active cardiac condition (No unstable coronary Syndrome such as severe unstable angina or recent [<1 month] myocardial infarction, decompensated CHF, severe valvular   disease or significant arrhythmia)     Previous anesthesia records:GETA, No problems and Not available     Subspecialty notes: ENT     Other important co-morbidities:         Tracheal stenosis   Hypertension   Gastroesophageal reflux disease   Subglottic stenosis   Hx PONV     Tests already available:  No recent tests.                            Instructions given. (See in Nurse's note)     Optimization:  Anesthesia Preop Clinic Assessment  Indicated-not required for this surgery                Plan:    Testing:  none needed                           Patient  has previously scheduled Medical Appointment:  10/31 CT     Navigation: Tests Scheduled. none                        Straight Line to surgery.                           Sheila Leiva RN                                             Electronically signed by Sheila Leiva RN at 11/5/2018  5:48 PM                                                                                                                12/03/2019  Denise Fry is a 49 y.o., female.    Pre-op Assessment    I have reviewed the Patient Summary Reports.      I have reviewed the Medications.     Review of Systems  Anesthesia Hx:  Hx of Anesthetic complications  History of prior surgery of interest to airway management  or planning: Previous anesthesia: General Personal Hx of Anesthesia complications, Post-Operative Nausea/Vomiting   Social:  Non-Smoker, Alcohol Use    Hematology/Oncology:  Hematology Normal   Oncology Normal     EENT/Dental:EENT/Dental Normal   Cardiovascular:   Hypertension    Pulmonary:  Pulmonary Normal Tracheal stenosis,  Subglottic stenosis   Renal/:  Renal/ Normal     Hepatic/GI:   GERD    Musculoskeletal:  Musculoskeletal Normal    Endocrine:  Endocrine Normal    Dermatological:  Skin Normal    Psych:   anxiety          Physical Exam  General:  Well nourished    Airway/Jaw/Neck:  Airway Findings: Mouth Opening: Normal Tongue: Normal  General Airway Assessment: Adult  Mallampati: II  TM Distance: Normal, at least 6 cm  Jaw/Neck Findings:      Dental:  Dental Findings: In tact        Mental Status:  Mental Status Findings:  Cooperative, Alert and Oriented         Anesthesia Plan  Type of Anesthesia, risks & benefits discussed:  Anesthesia Type:  general  Patient's Preference:    Intra-op Monitoring Plan: standard ASA monitors  Intra-op Monitoring Plan Comments:   Post Op Pain Control Plan: multimodal analgesia  Post Op Pain Control Plan Comments:   Induction:   IV  Beta Blocker:  Patient is on a Beta-Blocker and has received one dose within the past 24 hours (No further documentation required).       Informed Consent: Patient understands risks and agrees with Anesthesia plan.  Questions answered. Anesthesia consent signed with patient.  ASA Score: 3     Day of Surgery Review of History & Physical:  There are no significant changes.  H&P update referred to the surgeon.         Ready For Surgery From Anesthesia Perspective.

## 2019-12-03 NOTE — TRANSFER OF CARE
"Anesthesia Transfer of Care Note    Patient: Denise Fry    Procedure(s) Performed: Procedure(s) (LRB):  Suspension microlaryngoscopy, CO2 laser excision, steroid injection, balloon dilation; JET VENTILATION (N/A)    Patient location: PACU    Anesthesia Type: general    Transport from OR: Transported from OR on 6-10 L/min O2 by face mask with adequate spontaneous ventilation    Post pain: adequate analgesia    Post assessment: no apparent anesthetic complications and tolerated procedure well    Post vital signs: stable    Level of consciousness: awake, alert and oriented    Nausea/Vomiting: no nausea/vomiting    Complications: none    Transfer of care protocol was followed      Last vitals:   Visit Vitals  /75 (BP Location: Right arm, Patient Position: Lying)   Pulse 83   Temp 36.5 °C (97.7 °F) (Oral)   Resp 18   Ht 5' 2" (1.575 m)   Wt 82.6 kg (182 lb)   SpO2 100%   Breastfeeding? No   BMI 33.29 kg/m²     "

## 2019-12-03 NOTE — INTERVAL H&P NOTE
The patient has been examined and the H&P has been reviewed:    I concur with the findings and no changes have occurred since H&P was written.    Anesthesia/Surgery risks, benefits and alternative options discussed and understood by patient/family.          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

## 2019-12-03 NOTE — PLAN OF CARE
Vital signs are stable and within normal limit. Patient is awake, alert and oriented x2-pain is controlled, denies ponv.

## 2019-12-03 NOTE — PLAN OF CARE
Pt denies any metal implants, body piercings or jewelry, dentures, contacts or hearing aids. Preop questions answered. Instructed pt to call with any questions. Call light within reach. Family at bedside. Projected surgery start time given to pt. Pt. Verbalized understanding.Preop questions answered. Instructed pt to call with any questions.

## 2019-12-03 NOTE — PROGRESS NOTES
Called pharmacy bedside delivery to see status of pts med deliver, stated she is next in line to be brought up

## 2019-12-03 NOTE — OP NOTE
DATE OF PROCEDURE:  12/3/2019     PREOPERATIVE DIAGNOSIS:  Idiopathic laryngotracheal stenosis.     POSTOPERATIVE DIAGNOSIS:  Idiopathic laryngotracheal stenosis.     PROCEDURES PERFORMED:  1.  Suspension microlaryngoscopy with rigid tracheobronchoscopy.  2.  CO2 laser excision of laryngotracheal stenosis.  3.  Balloon dilation to 14 mm.  4.  Intralesional injection of Kenalog 40, 0.8 mL.     SURGEON:  August Mendoza M.D.     ASSISTANT:  Enrico Pierce M.D. (RES)     ANESTHESIA:  General, jet ventilation.     COMPLICATIONS:  None.     SPECIMENS:  None.     BLOOD LOSS:  Less than 5 mL.     FINDINGS:  1.  The patient is a straightforward exposure using an Ossoff-Pilling   subglottiscope.  2.  Corkscrewing stenosis is present, beginning in the posterior glottis,   tracking through the subglottis into the proximal trachea, with the airway lumen   narrowest at its most distal end, where it was estimated at approximately 5 mm.    The lumen is primarily right-sided.  The total length of the stenotic segment is   estimated at 2 cm. There was overall marked improvement in the stenosis at the posterior glottic and subglottic region in comparison to prior surgery.     INDICATIONS FOR PROCEDURE:  The patient is a 49-year-old lady with idiopathic   laryngotracheal stenosis, primarily centered in the subglottis and trachea,   though with some encroachment into the posterior glottis.  She has undergone   multiple prior endoscopic procedures in [place], the last of which was in December 2016, followed by endoscopic surgery by me last year and then a series of steroid injections.  She has been experiencing gradual worsening of her symptoms over the last   several months.  She presents today for endoscopic airway intervention.  Prior   to procedure, the risks, benefits and options were discussed at length with the   patient.  Risks included but were not limited to pain, bleeding, infection,   scar, recurrence, worsening of voice,  worsening of swallowing, aspiration,   damage to the lips, teeth, gums or tongue, tongue numbness, taste disturbance,   laser injury including vision loss and airway fire, risks of general anesthesia,   pneumomediastinum, pneumothorax, laryngotracheal rupture, pharyngoesophageal or   tracheoesophageal leak, fistula.  In spite of the risks of surgery, the patient   desired to move forward with the procedure.  Informed consent was obtained.     PROCEDURE IN DETAIL:  The patient was positively identified in the preoperative   holding area.  She was brought to the Operating Room and was placed in a flat   supine position.  Final timeout was performed for verification purposes.  IV   anesthesia induction was administered.  After confirmation of adequate mask   ventilation, IV muscle relaxation was administered.  Mask ventilation continued.    The eyes were protected with moist sponges.  The upper dentition was protected   with a reinforced mouthguard.  The Ossoff-Pilling subglottiscope was lubricated   and was inserted into the oral cavity via the right lingual gutter.  It was   utilized to first inspect the endolarynx and then to expose the subglottis.  The airway was   topically anesthetized with 3 mL of 4% lidocaine.  The patient was suspended   from the Farris  the usual fashion.  Jet ventilation was initiated via the   subglottiscope.  Adequate chest rise and fall were noted.  Over the course of   the case driving pressure varied from 25 to 35 and the frequency of ventilation   varied from 20 to 50.  Next, magnified laryngotracheal bronchoscopy was   performed with the 0-degree Muse richard telescope connected to a video monitor.    Findings were as noted above.  Once beyond the approximately 2 cm stenotic   segment, the remainder of the trachea and proximal mainstem bronchi were noted   to be normal.  During a brief apneic period, the laryngoscope   was repositioned at the glottic level.  The 12 mm radial  dilation balloon catheter was advanced   into the subglottis during a brief period of apnea.  It was inflated to its   maximal working pressure of 10 atmospheres for duration of 30 seconds.    Thereafter, the balloon was deflated and was removed.  The laryngoscope was   again positioned into the subglottis.  Jet ventilation continued.  There was   relief in the extent of airway obstruction and improvement in the   ease of ventilation.  The airway was suctioned with the rigid bronchoscopic   suction.  Magnified tracheobronchoscopy was once more performed with the 0 degree telescope.    This demonstrated improvement in the caliber of the airway. During another brief apneic period, the 14 mm radial dilation balloon catheter was advanced   into the subglottis.  It was inflated to its   maximal working pressure of 10 atmospheres for duration of 30 seconds.    Thereafter, the balloon was deflated and was removed.  Jet ventilation continued.  There was   further relief in the extent of airway obstruction and improvement in the   ease of ventilation.  The airway was suctioned with the rigid bronchoscopic   suction.  Magnified tracheobronchoscopy was once more performed with the 0 degree telescope.    This demonstrated further improvement in the caliber of the airway, yet the distalmost end of the segment was overhooding, especially along the left hemicircumference. Attention was turned to the CO2 laser relief of stenosis.   The fraction of inspired oxygen was maintained at less than 30% during all   portions of laser use.  All Operating Room personnel were equipped with   laser-safe eyewear.  All exposed regions of the patient's skin were protected   with moist towels.  Under the maximal magnification of the operating microscope,   the CO2 laser, with settings of 6 zamora superpulse was used to ablate the overhooding contracture at the distalmost left aspect of the stenosis. Jet ventilation was resumed and the laser was    deactivated while the patient was again ventilated with 100% FIO2. During another brief apneic period, the 14 mm radial dilation balloon catheter was advanced   into the subglottis.  It was inflated to its   maximal working pressure of 10 atmospheres for duration of 30 seconds.    Thereafter, the balloon was deflated and was removed.  Jet ventilation continued.  There was marked  further relief in the extent of airway obstruction and improvement in the   ease of ventilation.  The diseased segment was flush with the native airway walls. The airway was suctioned with the rigid bronchoscopic   suction.  Magnified tracheobronchoscopy was once more performed with the 0 degree telescope. Next, a solution of   Kenalog 40 was loaded into the laryngotracheal injector needle and was used to inject   the stenotic segment in a circumferential fashion.  This was carried up from the trachea into the subglottis and   posterior glottis, with total volume administered 0.8 mL.     Jet ventilation ceased.  All equipment was removed from the mouth.  The patient was transitioned back to mask  ventilation and was thereafter handed back over to the Anesthesiology team for   emergence from anesthesia, which was uneventful.  Thereafter, the patient was   escorted to the Recovery Room in good condition.  The patient tolerated the   procedure well.  There were no complications.  All needle, sponge and instrument   counts were correct at the end of the case.     ATTESTATION:  I, as the attending of record, was present and participated in all   portions of this procedure.

## 2019-12-04 NOTE — ANESTHESIA POSTPROCEDURE EVALUATION
Anesthesia Post Evaluation    Patient: Denise Fry    Procedure(s) Performed: Procedure(s) (LRB):  Suspension microlaryngoscopy, CO2 laser excision, steroid injection, balloon dilation; JET VENTILATION (N/A)    Final Anesthesia Type: general    Patient location during evaluation: PACU  Patient participation: Yes- Able to Participate  Level of consciousness: awake and alert  Post-procedure vital signs: reviewed and stable  Pain management: adequate  Airway patency: patent    PONV status at discharge: No PONV  Anesthetic complications: no      Cardiovascular status: blood pressure returned to baseline  Respiratory status: unassisted  Hydration status: euvolemic  Follow-up not needed.          Vitals Value Taken Time   /76 12/3/2019  4:15 PM   Temp 37 °C (98.6 °F) 12/3/2019  4:15 PM   Pulse 80 12/3/2019  4:15 PM   Resp 15 12/3/2019  3:40 PM   SpO2 98 % 12/3/2019  4:15 PM         Event Time     Out of Recovery 14:45:00          Pain/Roger Score: Pain Rating Prior to Med Admin: 4 (12/3/2019  2:57 PM)  Roger Score: 10 (12/3/2019  3:40 PM)

## 2019-12-06 DIAGNOSIS — K21.9 GASTROESOPHAGEAL REFLUX DISEASE WITHOUT ESOPHAGITIS: ICD-10-CM

## 2019-12-09 RX ORDER — OMEPRAZOLE 40 MG/1
CAPSULE, DELAYED RELEASE ORAL
Qty: 90 CAPSULE | Refills: 1 | Status: SHIPPED | OUTPATIENT
Start: 2019-12-09 | End: 2020-04-20 | Stop reason: SDUPTHER

## 2019-12-09 NOTE — PROGRESS NOTES
Refill Authorization Note     is requesting a refill authorization.    Brief assessment and rationale for refill: APPROVE: prr  Name and strength of medication: omeprazole (PRILOSEC) 40 MG capsule       Medication Therapy Plan: GERD commented on 10/10/19 Sona    Medication reconciliation completed: No              How patient will take medication: t1c po qd           Comments:   Requested Prescriptions   Pending Prescriptions Disp Refills    omeprazole (PRILOSEC) 40 MG capsule [Pharmacy Med Name: OMEPRAZOLE DR CAPS 40MG] 90 capsule 1     Sig: TAKE 1 CAPSULE DAILY       Gastroenterology: Proton Pump Inhibitors Failed - 12/9/2019  4:16 PM        Failed - GERD is on problem list         Passed - Patient is at least 18 years old        Passed - Osteoporosis is not on problem list        Passed - Plavix is not on active medication list        Passed - Valid encounter within last 12 months     Recent Outpatient Visits            1 week ago Subglottic stenosis    [facility] August Mendoza MD    2 weeks ago Essential hypertension    [facility] Edel Glaser PA-C    2 months ago Wellness examination    [facility] Edel Glaser PA-C    2 months ago Subglottic stenosis    [facility] August Mendoza MD    5 months ago Essential hypertension    [facility] ANTONELLA Chaidez MD          Future Appointments              In 1 month [facility]endoza [facility]    In 4 months ANTONELLA Chaidez MD [facility]                  Appointments  past 15m or future 3m with PCP    Date Provider   Last Visit   6/17/2019 ANTONELLA Chaidez MD   Next Visit   4/20/2020 ANTONELLA Chaidez MD

## 2020-01-08 ENCOUNTER — OFFICE VISIT (OUTPATIENT)
Dept: OTOLARYNGOLOGY | Facility: CLINIC | Age: 50
End: 2020-01-08
Payer: COMMERCIAL

## 2020-01-08 VITALS
DIASTOLIC BLOOD PRESSURE: 79 MMHG | TEMPERATURE: 99 F | SYSTOLIC BLOOD PRESSURE: 161 MMHG | HEART RATE: 63 BPM | WEIGHT: 187.63 LBS | BODY MASS INDEX: 34.31 KG/M2

## 2020-01-08 DIAGNOSIS — J39.8 TRACHEAL STENOSIS: ICD-10-CM

## 2020-01-08 DIAGNOSIS — J38.6 SUBGLOTTIC STENOSIS: Primary | ICD-10-CM

## 2020-01-08 PROCEDURE — 99213 PR OFFICE/OUTPT VISIT, EST, LEVL III, 20-29 MIN: ICD-10-PCS | Mod: 25,S$GLB,, | Performed by: OTOLARYNGOLOGY

## 2020-01-08 PROCEDURE — 99213 OFFICE O/P EST LOW 20 MIN: CPT | Mod: 25,S$GLB,, | Performed by: OTOLARYNGOLOGY

## 2020-01-08 PROCEDURE — 3008F PR BODY MASS INDEX (BMI) DOCUMENTED: ICD-10-PCS | Mod: CPTII,S$GLB,, | Performed by: OTOLARYNGOLOGY

## 2020-01-08 PROCEDURE — 3078F DIAST BP <80 MM HG: CPT | Mod: CPTII,S$GLB,, | Performed by: OTOLARYNGOLOGY

## 2020-01-08 PROCEDURE — 99999 PR PBB SHADOW E&M-EST. PATIENT-LVL III: CPT | Mod: PBBFAC,,, | Performed by: OTOLARYNGOLOGY

## 2020-01-08 PROCEDURE — 3078F PR MOST RECENT DIASTOLIC BLOOD PRESSURE < 80 MM HG: ICD-10-PCS | Mod: CPTII,S$GLB,, | Performed by: OTOLARYNGOLOGY

## 2020-01-08 PROCEDURE — 31575 PR LARYNGOSCOPY, FLEXIBLE; DIAGNOSTIC: ICD-10-PCS | Mod: S$GLB,,, | Performed by: OTOLARYNGOLOGY

## 2020-01-08 PROCEDURE — 31575 DIAGNOSTIC LARYNGOSCOPY: CPT | Mod: S$GLB,,, | Performed by: OTOLARYNGOLOGY

## 2020-01-08 PROCEDURE — 3077F SYST BP >= 140 MM HG: CPT | Mod: CPTII,S$GLB,, | Performed by: OTOLARYNGOLOGY

## 2020-01-08 PROCEDURE — 99999 PR PBB SHADOW E&M-EST. PATIENT-LVL III: ICD-10-PCS | Mod: PBBFAC,,, | Performed by: OTOLARYNGOLOGY

## 2020-01-08 PROCEDURE — 3008F BODY MASS INDEX DOCD: CPT | Mod: CPTII,S$GLB,, | Performed by: OTOLARYNGOLOGY

## 2020-01-08 PROCEDURE — 3077F PR MOST RECENT SYSTOLIC BLOOD PRESSURE >= 140 MM HG: ICD-10-PCS | Mod: CPTII,S$GLB,, | Performed by: OTOLARYNGOLOGY

## 2020-01-08 RX ORDER — SULFAMETHOXAZOLE AND TRIMETHOPRIM 800; 160 MG/1; MG/1
1 TABLET ORAL 2 TIMES DAILY
Qty: 28 TABLET | Refills: 0 | Status: SHIPPED | OUTPATIENT
Start: 2020-01-08 | End: 2020-01-22

## 2020-01-08 NOTE — PROGRESS NOTES
[facility]  Department of Otorhinolaryngology and Communication Sciences    Subjective:      Denise Fyr is a 49 y.o. female who presents for follow-up. She has idiopathic laryngotracheal stenosis.    TREATMENT HISTORY:  11/13/2018 - SML/TB; CO2 laser; dilation 12 mm; kenalog  4/2019-7/2019 - office steroid injections  12/3/2019 - SML/TB; CO2 laser; dilation 14 mm; kenalog    Doing great since surgery. Has much more energy. No dyspnea at rest or on exertion. Voice is good. Pleased with her progress.     The patient's medications, allergies, past medical, surgical, social and family histories were reviewed and updated as appropriate.    A detailed review of systems was obtained with pertinent positives as per the above HPI, and otherwise negative.      Objective:     BP (!) 161/79   Pulse 63   Temp 98.8 °F (37.1 °C)   Wt 85.1 kg (187 lb 9.8 oz)   BMI 34.31 kg/m²      Constitutional: comfortable, well dressed  Psychiatric: appropriate affect  Respiratory: comfortably breathing, symmetric chest rise, no stridor  Voice: normal for age and gender  Head: normocephalic  Eyes: conjunctivae and sclerae clear  Indirect laryngoscopy is limited due to gag    Procedure  Flexible Laryngoscopy ([id number]): Laryngoscopy is indicated for assessment of upper aerodigestive structure and function. This was carried out transnasally with a distal chip videoendoscope. After verbal consent was obtained, the patient was positioned and the nose was topically decongested with 1% phenylephrine and topically anesthetized with 4% lidocaine. The endoscope was passed through the most patent nasal cavity and positioned to image the nasopharynx, larynx, and hypopharynx in detail. The following features were examined: nasopharyngeal, laryngeal, hypopharyngeal masses; velopharyngeal strength, closure, and symmetry of motion; vocal fold range and symmetry of motion; laryngeal mucosal edema, erythema, inflammation, and hydration; salivary  pooling; and gross laryngeal sensation. The equipment was removed. The patient tolerated the procedure well without complication. All findings were normal except:  - circumferential stenosis beginning at posterior glottis, tracking through subglottis into proximal trachea; persistent posterior gllotti edema noted  - right vocal fold with slightly limited lateral excursion and slightly elongated      Assessment:     Denise Fry is a 49 y.o. female with idiopathic laryngotracheal stenosis. She is making favorable progress following recent endoscopic surgical intervention.     Plan:     Reassurance was provided. I recommended she complete a 2 week course of Bactrim.     She will follow up with me in about 2 months.    August Mendoza M.D.  [facility]  Department of Otorhinolaryngology and Communication Sciences

## 2020-01-08 NOTE — PATIENT INSTRUCTIONS
Sulfamethoxazole; Trimethoprim, SMX-TMP tablets  What is this medicine?  SULFAMETHOXAZOLE; TRIMETHOPRIM or SMX-TMP (suhl fuh meth OK valeria zohl; trye METH oh prim) is a combination of a sulfonamide antibiotic and a second antibiotic, trimethoprim. It is used to treat or prevent certain kinds of bacterial infections. It will not work for colds, flu, or other viral infections.  How should I use this medicine?  Take this medicine by mouth with a full glass of water. Follow the directions on the prescription label. Take your medicine at regular intervals. Do not take it more often than directed. Do not skip doses or stop your medicine early.  Talk to your pediatrician regarding the use of this medicine in children. Special care may be needed. This medicine has been used in children as young as 2 months of age.  What side effects may I notice from receiving this medicine?  Side effects that you should report to your doctor or health care professional as soon as possible:  · allergic reactions like skin rash or hives, swelling of the face, lips, or tongue  · breathing problems  · fever or chills, sore throat  · irregular heartbeat, chest pain  · joint or muscle pain  · pain or difficulty passing urine  · red pinpoint spots on skin  · redness, blistering, peeling or loosening of the skin, including inside the mouth  · unusual bleeding or bruising  · unusually weak or tired  · yellowing of the eyes or skin  Side effects that usually do not require medical attention (report to your doctor or health care professional if they continue or are bothersome):  · diarrhea  · dizziness  · headache  · loss of appetite  · nausea, vomiting  · nervousness  What may interact with this medicine?  Do not take this medicine with any of the following medications:  · aminobenzoate potassium  · dofetilide  · metronidazole  This medicine may also interact with the following medications:  · ACE inhibitors like benazepril, enalapril, lisinopril,  and ramipril  · birth control pills  · cyclosporine  · digoxin  · diuretics  · indomethacin  · medicines for diabetes  · methenamine  · methotrexate  · phenytoin  · potassium supplements  · pyrimethamine  · sulfinpyrazone  · tricyclic antidepressants  · warfarin  What if I miss a dose?  If you miss a dose, take it as soon as you can. If it is almost time for your next dose, take only that dose. Do not take double or extra doses.  Where should I keep my medicine?  Keep out of the reach of children.  Store at room temperature between 20 to 25 degrees C (68 to 77 degrees F). Protect from light. Throw away any unused medicine after the expiration date.  What should I tell my health care provider before I take this medicine?  They need to know if you have any of these conditions:  · anemia  · asthma  · being treated with anticonvulsants  · if you frequently drink alcohol containing drinks  · kidney disease  · liver disease  · low level of folic acid or glucose-6-phosphate dehydrogenase  · poor nutrition or malabsorption  · porphyria  · severe allergies  · thyroid disorder  · an unusual or allergic reaction to sulfamethoxazole, trimethoprim, sulfa drugs, other medicines, foods, dyes, or preservatives  · pregnant or trying to get pregnant  · breast-feeding  What should I watch for while using this medicine?  Tell your doctor or health care professional if your symptoms do not improve. Drink several glasses of water a day to reduce the risk of kidney problems.  Do not treat diarrhea with over the counter products. Contact your doctor if you have diarrhea that lasts more than 2 days or if it is severe and watery.  This medicine can make you more sensitive to the sun. Keep out of the sun. If you cannot avoid being in the sun, wear protective clothing and use a sunscreen. Do not use sun lamps or tanning beds/booths.  NOTE:This sheet is a summary. It may not cover all possible information. If you have questions about this  medicine, talk to your doctor, pharmacist, or health care provider. Copyright© 2017 [employer]

## 2020-02-10 ENCOUNTER — OFFICE VISIT (OUTPATIENT)
Dept: OTOLARYNGOLOGY | Facility: CLINIC | Age: 50
End: 2020-02-10
Payer: COMMERCIAL

## 2020-02-10 VITALS
HEART RATE: 69 BPM | WEIGHT: 181.88 LBS | BODY MASS INDEX: 33.27 KG/M2 | SYSTOLIC BLOOD PRESSURE: 141 MMHG | DIASTOLIC BLOOD PRESSURE: 71 MMHG

## 2020-02-10 DIAGNOSIS — J38.6 SUBGLOTTIC STENOSIS: Primary | ICD-10-CM

## 2020-02-10 DIAGNOSIS — J39.8 TRACHEAL STENOSIS: ICD-10-CM

## 2020-02-10 PROCEDURE — 99213 OFFICE O/P EST LOW 20 MIN: CPT | Mod: 25,S$GLB,, | Performed by: OTOLARYNGOLOGY

## 2020-02-10 PROCEDURE — 3078F PR MOST RECENT DIASTOLIC BLOOD PRESSURE < 80 MM HG: ICD-10-PCS | Mod: CPTII,S$GLB,, | Performed by: OTOLARYNGOLOGY

## 2020-02-10 PROCEDURE — 3077F PR MOST RECENT SYSTOLIC BLOOD PRESSURE >= 140 MM HG: ICD-10-PCS | Mod: CPTII,S$GLB,, | Performed by: OTOLARYNGOLOGY

## 2020-02-10 PROCEDURE — 31575 PR LARYNGOSCOPY, FLEXIBLE; DIAGNOSTIC: ICD-10-PCS | Mod: S$GLB,,, | Performed by: OTOLARYNGOLOGY

## 2020-02-10 PROCEDURE — 31575 DIAGNOSTIC LARYNGOSCOPY: CPT | Mod: S$GLB,,, | Performed by: OTOLARYNGOLOGY

## 2020-02-10 PROCEDURE — 99999 PR PBB SHADOW E&M-EST. PATIENT-LVL III: ICD-10-PCS | Mod: PBBFAC,,, | Performed by: OTOLARYNGOLOGY

## 2020-02-10 PROCEDURE — 3008F BODY MASS INDEX DOCD: CPT | Mod: CPTII,S$GLB,, | Performed by: OTOLARYNGOLOGY

## 2020-02-10 PROCEDURE — 3077F SYST BP >= 140 MM HG: CPT | Mod: CPTII,S$GLB,, | Performed by: OTOLARYNGOLOGY

## 2020-02-10 PROCEDURE — 99999 PR PBB SHADOW E&M-EST. PATIENT-LVL III: CPT | Mod: PBBFAC,,, | Performed by: OTOLARYNGOLOGY

## 2020-02-10 PROCEDURE — 3008F PR BODY MASS INDEX (BMI) DOCUMENTED: ICD-10-PCS | Mod: CPTII,S$GLB,, | Performed by: OTOLARYNGOLOGY

## 2020-02-10 PROCEDURE — 99213 PR OFFICE/OUTPT VISIT, EST, LEVL III, 20-29 MIN: ICD-10-PCS | Mod: 25,S$GLB,, | Performed by: OTOLARYNGOLOGY

## 2020-02-10 PROCEDURE — 3078F DIAST BP <80 MM HG: CPT | Mod: CPTII,S$GLB,, | Performed by: OTOLARYNGOLOGY

## 2020-02-10 RX ORDER — METHYLPREDNISOLONE 4 MG/1
TABLET ORAL
Qty: 1 PACKAGE | Refills: 0 | Status: SHIPPED | OUTPATIENT
Start: 2020-02-10 | End: 2020-04-20

## 2020-02-10 RX ORDER — AMOXICILLIN AND CLAVULANATE POTASSIUM 875; 125 MG/1; MG/1
1 TABLET, FILM COATED ORAL 2 TIMES DAILY
Qty: 20 TABLET | Refills: 0 | Status: SHIPPED | OUTPATIENT
Start: 2020-02-10 | End: 2020-02-20

## 2020-02-10 NOTE — PATIENT INSTRUCTIONS
Amoxicillin; Clavulanic Acid tablets  What is this medicine?  AMOXICILLIN; CLAVULANIC ACID (a mox i JOVAN in; MIREYA osmar noel ic AS id) is a penicillin antibiotic. It is used to treat certain kinds of bacterial infections. It will not work for colds, flu, or other viral infections.  How should I use this medicine?  Take this medicine by mouth with a full glass of water. Follow the directions on the prescription label. Take at the start of a meal. Do not crush or chew. If the tablet has a score line, you may cut it in half at the score line for easier swallowing. Take your medicine at regular intervals. Do not take your medicine more often than directed. Take all of your medicine as directed even if you think you are better. Do not skip doses or stop your medicine early.  Talk to your pediatrician regarding the use of this medicine in children. Special care may be needed.  What side effects may I notice from receiving this medicine?  Side effects that you should report to your doctor or health care professional as soon as possible:  · allergic reactions like skin rash, itching or hives, swelling of the face, lips, or tongue  · breathing problems  · dark urine  · fever or chills, sore throat  · redness, blistering, peeling or loosening of the skin, including inside the mouth  · seizures  · trouble passing urine or change in the amount of urine  · unusual bleeding, bruising  · unusually weak or tired  · white patches or sores in the mouth or throat  Side effects that usually do not require medical attention (report to your doctor or health care professional if they continue or are bothersome):  · diarrhea  · dizziness  · headache  · nausea, vomiting  · stomach upset  · vaginal or anal irritation  What may interact with this medicine?  · allopurinol  · anticoagulants  · birth control pills  · methotrexate  · probenecid  What if I miss a dose?  If you miss a dose, take it as soon as you can. If it is almost time for your  next dose, take only that dose. Do not take double or extra doses.  Where should I keep my medicine?  Keep out of the reach of children.  Store at room temperature below 25 degrees C (77 degrees F). Keep container tightly closed. Throw away any unused medicine after the expiration date.  What should I tell my health care provider before I take this medicine?  They need to know if you have any of these conditions:  · bowel disease, like colitis  · kidney disease  · liver disease  · mononucleosis  · an unusual or allergic reaction to amoxicillin, penicillin, cephalosporin, other antibiotics, clavulanic acid, other medicines, foods, dyes, or preservatives  · pregnant or trying to get pregnant  · breast-feeding  What should I watch for while using this medicine?  Tell your doctor or health care professional if your symptoms do not improve.  Do not treat diarrhea with over the counter products. Contact your doctor if you have diarrhea that lasts more than 2 days or if it is severe and watery.  If you have diabetes, you may get a false-positive result for sugar in your urine. Check with your doctor or health care professional.  Birth control pills may not work properly while you are taking this medicine. Talk to your doctor about using an extra method of birth control.  NOTE:This sheet is a summary. It may not cover all possible information. If you have questions about this medicine, talk to your doctor, pharmacist, or health care provider. Copyright© 2017 [employer]        Methylprednisolone tablets  What is this medicine?  METHYLPREDNISOLONE (meth ill pred NISS oh lone) is a corticosteroid. It is commonly used to treat inflammation of the skin, joints, lungs, and other organs. Common conditions treated include asthma, allergies, and arthritis. It is also used for other conditions, such as blood disorders and diseases of the adrenal glands.  How should I use this medicine?  Take this medicine by mouth with a drink of  water. Follow the directions on the prescription label. Take it with food or milk to avoid stomach upset. If you are taking this medicine once a day, take it in the morning. Do not take more medicine than you are told to take. Do not suddenly stop taking your medicine because you may develop a severe reaction. Your doctor will tell you how much medicine to take. If your doctor wants you to stop the medicine, the dose may be slowly lowered over time to avoid any side effects.  Talk to your pediatrician regarding the use of this medicine in children. Special care may be needed.  What side effects may I notice from receiving this medicine?  Side effects that you should report to your doctor or health care professional as soon as possible:  · allergic reactions like skin rash, itching or hives, swelling of the face, lips, or tongue  · eye pain, decreased or blurred vision, or bulging eyes  · fever, sore throat, sneezing, cough, or other signs of infection, wounds that will not heal  · increased thirst  · mental depression, mood swings, mistaken feelings of self importance or of being mistreated  · pain in hips, back, ribs, arms, shoulders, or legs  · swelling of the ankles, feet, hands  · trouble passing urine or change in the amount of urine  Side effects that usually do not require medical attention (report to your doctor or health care professional if they continue or are bothersome):  · confusion, excitement, restlessness  · headache  · nausea, vomiting  · skin problems, acne, thin and shiny skin  · weight gain  What may interact with this medicine?  Do not take this medicine with any of the following medications:  · mifepristone  This medicine may also interact with the following medications:  · tacrolimus  · vaccines  · warfarin  What if I miss a dose?  If you miss a dose, take it as soon as you can. If it is almost time for your next dose, talk to your doctor or health care professional. You may need to miss a  dose or take an extra dose. Do not take double or extra doses without advice.  Where should I keep my medicine?  Keep out of the reach of children.  Store at room temperature between 20 and 25 degrees C (68 and 77 degrees F). Throw away any unused medicine after the expiration date.  What should I tell my health care provider before I take this medicine?  They need to know if you have any of these conditions:  · Cushing's syndrome  · diabetes  · glaucoma  · heart problems or disease  · high blood pressure  · infection such as herpes, measles, tuberculosis, or chickenpox  · kidney disease  · liver disease  · mental problems  · myasthenia gravis  · osteoporosis  · seizures  · stomach ulcer or intestine disease including colitis and diverticulitis  · thyroid problem  · an unusual or allergic reaction to lactose, methylprednisolone, other medicines, foods, dyes, or preservatives  · pregnant or trying to get pregnant  · breast-feeding  What should I watch for while using this medicine?  Visit your doctor or health care professional for regular checks on your progress. If you are taking this medicine for a long time, carry an identification card with your name and address, the type and dose of your medicine, and your doctor's name and address.  The medicine may increase your risk of getting an infection. Stay away from people who are sick. Tell your doctor or health care professional if you are around anyone with measles or chickenpox.  If you are going to have surgery, tell your doctor or health care professional that you have taken this medicine within the last twelve months.  Ask your doctor or health care professional about your diet. You may need to lower the amount of salt you eat.  The medicine can increase your blood sugar. If you are a diabetic check with your doctor if you need help adjusting the dose of your diabetic medicine.  NOTE:This sheet is a summary. It may not cover all possible information. If you have  questions about this medicine, talk to your doctor, pharmacist, or health care provider. Copyright© 2017 [employer] Standard

## 2020-02-10 NOTE — PROGRESS NOTES
[facility]  Department of Otorhinolaryngology and Communication Sciences    Subjective:      Denise Fry is a 49 y.o. female who presents for follow-up. She has idiopathic laryngotracheal stenosis.    TREATMENT HISTORY:  11/13/2018 - SML/TB; CO2 laser; dilation 12 mm; kenalog  4/2019-7/2019 - office steroid injections  12/3/2019 - SML/TB; CO2 laser; dilation 14 mm; kenalog    She is still doing well. Breathing is still good. Had a cold recently. Complains of excess mucus. No dyspnea at rest or on exertion. Voice is good. Pleased with her progress.     The patient's medications, allergies, past medical, surgical, social and family histories were reviewed and updated as appropriate.    A detailed review of systems was obtained with pertinent positives as per the above HPI, and otherwise negative.      Objective:     BP (!) 141/71 (Patient Position: Sitting)   Pulse 69   Wt 82.5 kg (181 lb 14.1 oz)   BMI 33.27 kg/m²      Constitutional: comfortable, well dressed  Psychiatric: appropriate affect  Respiratory: comfortably breathing, symmetric chest rise, no stridor  Voice: normal for age and gender  Head: normocephalic  Eyes: conjunctivae and sclerae clear  Indirect laryngoscopy is limited due to gag    Procedure  Flexible Laryngoscopy (79286): Laryngoscopy is indicated for assessment of upper aerodigestive structure and function. This was carried out transnasally with a distal chip videoendoscope. After verbal consent was obtained, the patient was positioned and the nose was topically decongested with 1% phenylephrine and topically anesthetized with 4% lidocaine. The endoscope was passed through the most patent nasal cavity and positioned to image the nasopharynx, larynx, and hypopharynx in detail. The following features were examined: nasopharyngeal, laryngeal, hypopharyngeal masses; velopharyngeal strength, closure, and symmetry of motion; vocal fold range and symmetry of motion; laryngeal mucosal  edema, erythema, inflammation, and hydration; salivary pooling; and gross laryngeal sensation. The equipment was removed. The patient tolerated the procedure well without complication. All findings were normal except:  - circumferential stenosis beginning at posterior glottis, tracking through subglottis into proximal trachea; persistent posterior glottic edema noted with mild diffuse exudate  - right vocal fold with slightly limited lateral excursion and slightly elongated      Assessment:     Denise Fry is a 49 y.o. female with idiopathic laryngotracheal stenosis. She has made favorable progress following recent endoscopic surgical intervention.    Today she demonstrates signs of laryngotracheitis.     Plan:     Reassurance was provided. I recommended another course of antibiotics and steroid taper.    She will follow up with me in about 6 weeks.    August Mendoza M.D.  [facility]  Department of Otorhinolaryngology and Communication Sciences

## 2020-03-24 ENCOUNTER — TELEPHONE (OUTPATIENT)
Dept: OTOLARYNGOLOGY | Facility: CLINIC | Age: 50
End: 2020-03-24

## 2020-03-24 NOTE — TELEPHONE ENCOUNTER
----- Message from Ramona Garcia sent at 3/24/2020  9:11 AM CDT -----  Contact: pt at [phone]  Reji Nathan-Pt is returning your call in ref to her upcoming appt.  Received a message yesterday.

## 2020-04-20 ENCOUNTER — OFFICE VISIT (OUTPATIENT)
Dept: FAMILY MEDICINE | Facility: CLINIC | Age: 50
End: 2020-04-20
Attending: FAMILY MEDICINE
Payer: COMMERCIAL

## 2020-04-20 DIAGNOSIS — F51.01 PRIMARY INSOMNIA: ICD-10-CM

## 2020-04-20 DIAGNOSIS — F41.9 ANXIETY: ICD-10-CM

## 2020-04-20 DIAGNOSIS — K21.9 GASTROESOPHAGEAL REFLUX DISEASE WITHOUT ESOPHAGITIS: ICD-10-CM

## 2020-04-20 DIAGNOSIS — I10 ESSENTIAL HYPERTENSION: ICD-10-CM

## 2020-04-20 PROCEDURE — 99214 PR OFFICE/OUTPT VISIT, EST, LEVL IV, 30-39 MIN: ICD-10-PCS | Mod: 95,,, | Performed by: FAMILY MEDICINE

## 2020-04-20 PROCEDURE — 99214 OFFICE O/P EST MOD 30 MIN: CPT | Mod: 95,,, | Performed by: FAMILY MEDICINE

## 2020-04-20 RX ORDER — OMEPRAZOLE 40 MG/1
40 CAPSULE, DELAYED RELEASE ORAL DAILY
Qty: 90 CAPSULE | Refills: 3 | Status: SHIPPED | OUTPATIENT
Start: 2020-04-20 | End: 2021-04-09 | Stop reason: SDUPTHER

## 2020-04-20 RX ORDER — LOSARTAN POTASSIUM 25 MG/1
25 TABLET ORAL DAILY
Qty: 90 TABLET | Refills: 3 | Status: SHIPPED | OUTPATIENT
Start: 2020-04-20 | End: 2021-04-23

## 2020-04-20 RX ORDER — ZOLPIDEM TARTRATE 10 MG/1
10 TABLET ORAL NIGHTLY PRN
Qty: 90 TABLET | Refills: 1 | Status: SHIPPED | OUTPATIENT
Start: 2020-04-20 | End: 2020-09-18 | Stop reason: SDUPTHER

## 2020-04-20 RX ORDER — BISOPROLOL FUMARATE AND HYDROCHLOROTHIAZIDE 10; 6.25 MG/1; MG/1
1 TABLET ORAL DAILY
Qty: 90 TABLET | Refills: 3 | Status: SHIPPED | OUTPATIENT
Start: 2020-04-20 | End: 2021-04-23

## 2020-04-20 RX ORDER — ESCITALOPRAM OXALATE 10 MG/1
10 TABLET ORAL DAILY
Qty: 90 TABLET | Refills: 3 | Status: SHIPPED | OUTPATIENT
Start: 2020-04-20 | End: 2021-04-23

## 2020-04-20 NOTE — PROGRESS NOTES
Subjective:       Patient ID: Denise Fry is a 49 y.o. female.    Chief Complaint: No chief complaint on file.    The patient location is: [place], [place]  The chief complaint leading to consultation is: followup  Visit type: TELE AUDIOVISUAL  Total time spent with patient: 12 minutes  Each patient to whom he or she provides medical services by telemedicine is:  (1) informed of the relationship between the physician and patient and the respective role of any other health care provider with respect to management of the patient; and (2) notified that he or she may decline to receive medical services by telemedicine and may withdraw from such care at any time.    Notes: Pt is known to me.  The pt is doing well with no acute complaints.  She has been checking her BP--yesterday it was 137/80.  She feels the Lexapro has been a big help for her anxiety.  The pt is concerned about weight gain.  She has done WW in the past with success.    Review of Systems   Constitutional: Negative for activity change, fever and unexpected weight change.   HENT: Positive for rhinorrhea. Negative for hearing loss and trouble swallowing.    Eyes: Negative for discharge and visual disturbance.   Respiratory: Negative for cough, chest tightness, shortness of breath and wheezing.    Cardiovascular: Negative for chest pain and palpitations.   Gastrointestinal: Negative for blood in stool, constipation, diarrhea and vomiting.   Endocrine: Negative for polydipsia and polyuria.   Genitourinary: Negative for difficulty urinating, dysuria, hematuria and menstrual problem.   Musculoskeletal: Negative for arthralgias, joint swelling and neck pain.   Neurological: Negative for weakness and headaches.   Psychiatric/Behavioral: Negative for confusion, dysphoric mood and sleep disturbance. The patient is not nervous/anxious.        Objective:      Physical Exam   Constitutional: She is oriented to person, place, and time. She appears well-developed  and well-nourished. No distress.   Pulmonary/Chest: Effort normal.   Neurological: She is alert and oriented to person, place, and time.   Psychiatric: She has a normal mood and affect. Her behavior is normal. Judgment and thought content normal.       Assessment:       1. Gastroesophageal reflux disease without esophagitis    2. Essential hypertension    3. Anxiety    4. Primary insomnia        Plan:       Diagnoses and all orders for this visit:    Gastroesophageal reflux disease without esophagitis  -     omeprazole (PRILOSEC) 40 MG capsule; Take 1 capsule (40 mg total) by mouth once daily.    Essential hypertension  -     bisoproloL-hydrochlorothiazide (ZIAC) 10-6.25 mg per tablet; Take 1 tablet by mouth once daily.  -     losartan (COZAAR) 25 MG tablet; Take 1 tablet (25 mg total) by mouth once daily.    Anxiety  -     escitalopram oxalate (LEXAPRO) 10 MG tablet; Take 1 tablet (10 mg total) by mouth once daily.    Primary insomnia  -     zolpidem (AMBIEN) 10 mg Tab; Take 1 tablet (10 mg total) by mouth nightly as needed.      During this visit, I reviewed the pt's history, medications, allergies, and problem list.

## 2020-05-05 ENCOUNTER — PATIENT MESSAGE (OUTPATIENT)
Dept: ADMINISTRATIVE | Facility: HOSPITAL | Age: 50
End: 2020-05-05

## 2020-05-07 DIAGNOSIS — J38.6 SUBGLOTTIC STENOSIS: Primary | ICD-10-CM

## 2020-05-11 ENCOUNTER — PATIENT MESSAGE (OUTPATIENT)
Dept: OTOLARYNGOLOGY | Facility: CLINIC | Age: 50
End: 2020-05-11

## 2020-09-16 DIAGNOSIS — I10 ESSENTIAL HYPERTENSION: Primary | ICD-10-CM

## 2020-09-16 DIAGNOSIS — B00.1 FEVER BLISTER: ICD-10-CM

## 2020-09-16 NOTE — TELEPHONE ENCOUNTER
Care Due:                  Date            Visit Type   Department     Provider  --------------------------------------------------------------------------------                                MARLYT                              VIRTUAL      [facility]  Last Visit: 04-      VISIT        MEENAKSHI ESPINOZA                              FOLLOWUP/OF  [facility]  Next Visit: 09-      FICE VISIT   MEENAKSHI LAW                                                            Last  Test          Frequency    Reason                     Performed    Due Date  --------------------------------------------------------------------------------    Cr..........  6 months...  bisoproloL-hydrochlorothi  11- 05-                             azide, losartan,                             valACYclovir.............    K...........  6 months...  bisoproloL-hydrochlorothi  11- 05-                             azide, losartan..........    Na..........  6 months...  bisoproloL-hydrochlorothi  11- 05-                             azide....................    WBC.........  12 months..  valACYclovir.............  10-   10-    Powered by [employer]. Reference number: [id number]. 9/16/2020 8:14:53 AM CDT

## 2020-09-17 RX ORDER — VALACYCLOVIR HYDROCHLORIDE 500 MG/1
TABLET, FILM COATED ORAL
Qty: 90 TABLET | Refills: 0 | Status: SHIPPED | OUTPATIENT
Start: 2020-09-17 | End: 2020-11-11

## 2020-09-17 NOTE — PROGRESS NOTES
Refill Authorization Note   Denise Fry is requesting a refill authorization.     Brief assessment and rationale for refill: APPROVE: needs labs     Medication-related problems identified: Requires labs    Medication Therapy Plan: CDMR. NTBO(CMP/LIPID); Will wave CBC for as needed medication    Medication reconciliation completed: No                    Comments:      Orders Placed This Encounter    Comprehensive metabolic panel    Lipid Panel    valACYclovir (VALTREX) 500 MG tablet      Requested Prescriptions   Signed Prescriptions Disp Refills    valACYclovir (VALTREX) 500 MG tablet 90 tablet 0     Sig: TAKE 1 TABLET TWICE A DAY       Antimicrobials:  Oral Antiviral Agents - Anti-Herpetic Passed - 9/16/2020  1:37 PM        Passed - Patient is at least 18 years old        Passed - Office visit in past 12 months or future 90 days.     Recent Outpatient Visits            5 months ago Gastroesophageal reflux disease without esophagitis    [facility] ANTONELLA Chaidez MD    7 months ago Subglottic stenosis    [facility] 2nd Fl August Mendoza MD    8 months ago Subglottic stenosis    [facility] 2nd [facility] August Mendoza MD    9 months ago Subglottic stenosis    [facility] 2nd [facility] August Mendoza MD    10 months ago Essential hypertension    [facility] Edel Glaser, P[facility]          Future Appointments              Tomorrow ANTONELLA Chaidez MD [facility], [place]    Tomorrow COVID TESTING, [place] [facility]    In 4 days August Mendoza MD [facility] 2nd Fl, [facility]                Passed - Cr is 1.3 or below and within 360 days     Creatinine   Date Value Ref Range Status   11/19/2019 0.8 0.5 - 1.4 mg/dL Final   01/03/2019 0.8 0.5 - 1.4 mg/dL Final              Passed - WBC in normal range and within 360 days     WBC   Date Value Ref Range Status    10/10/2019 5.53 3.90 - 12.70 K/uL Final              Passed - eGFR within 360 days     eGFR if non    Date Value Ref Range Status   11/19/2019 >60 >60 mL/min/1.73 m^2 Final     Comment:     Calculation used to obtain the estimated glomerular filtration  rate (eGFR) is the CKD-EPI equation.      01/03/2019 >60.0 >60 mL/min/1.73 m^2 Final     Comment:     Calculation used to obtain the estimated glomerular filtration  rate (eGFR) is the CKD-EPI equation.        eGFR if    Date Value Ref Range Status   11/19/2019 >60 >60 mL/min/1.73 m^2 Final   01/03/2019 >60.0 >60 mL/min/1.73 m^2 Final                  Appointments  past 12m or future 3m with PCP    Date Provider   Last Visit   4/20/2020 ANTONELLA Chaidez MD   Next Visit   9/18/2020 ANTONELLA Chaidez MD   ED visits in past 90 days: 0     Note composed:10:36 AM 09/17/2020

## 2020-09-17 NOTE — PROGRESS NOTES
Provider Staff:     Please schedule patient for Labs (CMP/LIPID)    Thanks!  [facility]     Appointments  past 12m or future 3m with PCP    Date Provider   Last Visit   4/20/2020 ANTONELLA Chaidez MD   Next Visit   9/18/2020 ANTONELLA Chaidez MD     Note composed:10:37 AM 09/17/2020

## 2020-09-18 ENCOUNTER — PATIENT OUTREACH (OUTPATIENT)
Dept: ADMINISTRATIVE | Facility: OTHER | Age: 50
End: 2020-09-18

## 2020-09-18 ENCOUNTER — LAB VISIT (OUTPATIENT)
Dept: LAB | Facility: HOSPITAL | Age: 50
End: 2020-09-18
Attending: FAMILY MEDICINE
Payer: COMMERCIAL

## 2020-09-18 ENCOUNTER — LAB VISIT (OUTPATIENT)
Dept: URGENT CARE | Facility: CLINIC | Age: 50
End: 2020-09-18
Payer: COMMERCIAL

## 2020-09-18 ENCOUNTER — OFFICE VISIT (OUTPATIENT)
Dept: FAMILY MEDICINE | Facility: CLINIC | Age: 50
End: 2020-09-18
Payer: COMMERCIAL

## 2020-09-18 VITALS
WEIGHT: 189.63 LBS | OXYGEN SATURATION: 98 % | SYSTOLIC BLOOD PRESSURE: 152 MMHG | DIASTOLIC BLOOD PRESSURE: 80 MMHG | BODY MASS INDEX: 34.89 KG/M2 | HEART RATE: 67 BPM | HEIGHT: 62 IN | TEMPERATURE: 98 F

## 2020-09-18 DIAGNOSIS — I10 ESSENTIAL HYPERTENSION: ICD-10-CM

## 2020-09-18 DIAGNOSIS — Z00.00 WELLNESS EXAMINATION: Primary | ICD-10-CM

## 2020-09-18 DIAGNOSIS — F51.01 PRIMARY INSOMNIA: ICD-10-CM

## 2020-09-18 DIAGNOSIS — J38.6 SUBGLOTTIC STENOSIS: ICD-10-CM

## 2020-09-18 LAB
ALBUMIN SERPL BCP-MCNC: 4.1 G/DL (ref 3.5–5.2)
ALP SERPL-CCNC: 78 U/L (ref 55–135)
ALT SERPL W/O P-5'-P-CCNC: 25 U/L (ref 10–44)
ANION GAP SERPL CALC-SCNC: 7 MMOL/L (ref 8–16)
AST SERPL-CCNC: 22 U/L (ref 10–40)
BILIRUB SERPL-MCNC: 0.6 MG/DL (ref 0.1–1)
BUN SERPL-MCNC: 18 MG/DL (ref 6–20)
CALCIUM SERPL-MCNC: 10.2 MG/DL (ref 8.7–10.5)
CHLORIDE SERPL-SCNC: 104 MMOL/L (ref 95–110)
CHOLEST SERPL-MCNC: 207 MG/DL (ref 120–199)
CHOLEST/HDLC SERPL: 2.8 {RATIO} (ref 2–5)
CO2 SERPL-SCNC: 28 MMOL/L (ref 23–29)
CREAT SERPL-MCNC: 0.8 MG/DL (ref 0.5–1.4)
EST. GFR  (AFRICAN AMERICAN): >60 ML/MIN/1.73 M^2
EST. GFR  (NON AFRICAN AMERICAN): >60 ML/MIN/1.73 M^2
GLUCOSE SERPL-MCNC: 105 MG/DL (ref 70–110)
HDLC SERPL-MCNC: 74 MG/DL (ref 40–75)
HDLC SERPL: 35.7 % (ref 20–50)
LDLC SERPL CALC-MCNC: 116.2 MG/DL (ref 63–159)
NONHDLC SERPL-MCNC: 133 MG/DL
POTASSIUM SERPL-SCNC: 3.9 MMOL/L (ref 3.5–5.1)
PROT SERPL-MCNC: 7.2 G/DL (ref 6–8.4)
SODIUM SERPL-SCNC: 139 MMOL/L (ref 136–145)
TRIGL SERPL-MCNC: 84 MG/DL (ref 30–150)

## 2020-09-18 PROCEDURE — 3008F BODY MASS INDEX DOCD: CPT | Mod: CPTII,S$GLB,, | Performed by: FAMILY MEDICINE

## 2020-09-18 PROCEDURE — 3077F SYST BP >= 140 MM HG: CPT | Mod: CPTII,S$GLB,, | Performed by: FAMILY MEDICINE

## 2020-09-18 PROCEDURE — 99396 PR PREVENTIVE VISIT,EST,40-64: ICD-10-PCS | Mod: 25,S$GLB,, | Performed by: FAMILY MEDICINE

## 2020-09-18 PROCEDURE — 3079F PR MOST RECENT DIASTOLIC BLOOD PRESSURE 80-89 MM HG: ICD-10-PCS | Mod: CPTII,S$GLB,, | Performed by: FAMILY MEDICINE

## 2020-09-18 PROCEDURE — 99396 PREV VISIT EST AGE 40-64: CPT | Mod: 25,S$GLB,, | Performed by: FAMILY MEDICINE

## 2020-09-18 PROCEDURE — 99999 PR PBB SHADOW E&M-EST. PATIENT-LVL III: CPT | Mod: PBBFAC,,, | Performed by: FAMILY MEDICINE

## 2020-09-18 PROCEDURE — 90686 FLU VACCINE (QUAD) GREATER THAN OR EQUAL TO 3YO PRESERVATIVE FREE IM: ICD-10-PCS | Mod: S$GLB,,, | Performed by: FAMILY MEDICINE

## 2020-09-18 PROCEDURE — 3008F PR BODY MASS INDEX (BMI) DOCUMENTED: ICD-10-PCS | Mod: CPTII,S$GLB,, | Performed by: FAMILY MEDICINE

## 2020-09-18 PROCEDURE — 80053 COMPREHEN METABOLIC PANEL: CPT

## 2020-09-18 PROCEDURE — 36415 COLL VENOUS BLD VENIPUNCTURE: CPT | Mod: PO

## 2020-09-18 PROCEDURE — 90471 IMMUNIZATION ADMIN: CPT | Mod: S$GLB,,, | Performed by: FAMILY MEDICINE

## 2020-09-18 PROCEDURE — 80061 LIPID PANEL: CPT

## 2020-09-18 PROCEDURE — 90686 IIV4 VACC NO PRSV 0.5 ML IM: CPT | Mod: S$GLB,,, | Performed by: FAMILY MEDICINE

## 2020-09-18 PROCEDURE — U0003 INFECTIOUS AGENT DETECTION BY NUCLEIC ACID (DNA OR RNA); SEVERE ACUTE RESPIRATORY SYNDROME CORONAVIRUS 2 (SARS-COV-2) (CORONAVIRUS DISEASE [COVID-19]), AMPLIFIED PROBE TECHNIQUE, MAKING USE OF HIGH THROUGHPUT TECHNOLOGIES AS DESCRIBED BY CMS-2020-01-R: HCPCS

## 2020-09-18 PROCEDURE — 99999 PR PBB SHADOW E&M-EST. PATIENT-LVL III: ICD-10-PCS | Mod: PBBFAC,,, | Performed by: FAMILY MEDICINE

## 2020-09-18 PROCEDURE — 3079F DIAST BP 80-89 MM HG: CPT | Mod: CPTII,S$GLB,, | Performed by: FAMILY MEDICINE

## 2020-09-18 PROCEDURE — 90471 FLU VACCINE (QUAD) GREATER THAN OR EQUAL TO 3YO PRESERVATIVE FREE IM: ICD-10-PCS | Mod: S$GLB,,, | Performed by: FAMILY MEDICINE

## 2020-09-18 PROCEDURE — 3077F PR MOST RECENT SYSTOLIC BLOOD PRESSURE >= 140 MM HG: ICD-10-PCS | Mod: CPTII,S$GLB,, | Performed by: FAMILY MEDICINE

## 2020-09-18 RX ORDER — MINERAL OIL
180 ENEMA (ML) RECTAL DAILY
COMMUNITY
End: 2021-04-09

## 2020-09-18 RX ORDER — ZOLPIDEM TARTRATE 10 MG/1
10 TABLET ORAL NIGHTLY PRN
Qty: 90 TABLET | Refills: 1 | Status: SHIPPED | OUTPATIENT
Start: 2020-09-18 | End: 2021-06-03 | Stop reason: SDUPTHER

## 2020-09-18 NOTE — PROGRESS NOTES
"Subjective:       Patient ID: Denise Fry is a 50 y.o. female.    Chief Complaint: yearly preventive care visit    Pt is known to me.  The pt presents for annual wellness exam.  The pt has been feeling well.  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    Review of Systems   Constitutional: Negative for activity change and unexpected weight change.   HENT: Negative for hearing loss, rhinorrhea and trouble swallowing.    Eyes: Negative for discharge and visual disturbance.   Respiratory: Negative for chest tightness and wheezing.    Cardiovascular: Negative for chest pain and palpitations.   Gastrointestinal: Negative for blood in stool, constipation, diarrhea and vomiting.   Endocrine: Negative for polydipsia and polyuria.   Genitourinary: Negative for difficulty urinating, dysuria, hematuria and menstrual problem.   Musculoskeletal: Negative for arthralgias, joint swelling and neck pain.   Neurological: Negative for weakness and headaches.   Psychiatric/Behavioral: Negative for confusion and dysphoric mood.       Objective:       Vitals:    09/18/20 0906   BP: (!) 144/82   BP Location: Right arm   Patient Position: Sitting   Pulse: 67   Temp: 98.3 °F (36.8 °C)   TempSrc: Oral   SpO2: 98%   Weight: 86 kg (189 lb 9.5 oz)   Height: 5' 2" (1.575 m)     Physical Exam  Constitutional:       Appearance: She is well-developed.   HENT:      Head: Normocephalic.   Eyes:      Conjunctiva/sclera: Conjunctivae normal.      Pupils: Pupils are equal, round, and reactive to light.   Neck:      Musculoskeletal: Normal range of motion and neck supple.      Thyroid: No thyromegaly.   Cardiovascular:      Rate and Rhythm: Normal rate and regular rhythm.      Heart sounds: Normal heart sounds.   Pulmonary:      Effort: Pulmonary effort is normal.      Breath sounds: Normal breath sounds.   Abdominal:      General: Bowel sounds are normal.      Palpations: Abdomen is soft.      Tenderness: There is no abdominal tenderness. "   Musculoskeletal: Normal range of motion.         General: No tenderness or deformity.   Lymphadenopathy:      Cervical: No cervical adenopathy.   Skin:     General: Skin is warm and dry.   Neurological:      Mental Status: She is alert and oriented to person, place, and time.      Cranial Nerves: No cranial nerve deficit.      Motor: No abnormal muscle tone.      Coordination: Coordination normal.      Deep Tendon Reflexes: Reflexes normal.   Psychiatric:         Behavior: Behavior normal.         Assessment:       1. Wellness examination    2. Primary insomnia        Plan:       Denise was seen today for yearly preventive care visit.    Diagnoses and all orders for this visit:    Wellness examination    Primary insomnia  -     zolpidem (AMBIEN) 10 mg Tab; Take 1 tablet (10 mg total) by mouth nightly as needed.    Pt to have previously ordered labs drawn.    During this visit, I reviewed the pt's history, medications, allergies, and problem list.

## 2020-09-18 NOTE — PROGRESS NOTES
LINKS immunization registry updated  Health Maintenance updated  Chart reviewed for overdue Proactive [facility] ([facility]) health maintenance testing (CRS, Breast Ca, Diabetic Eye Exam)   Orders entered:N/A

## 2020-09-19 LAB — SARS-COV-2 RNA RESP QL NAA+PROBE: NOT DETECTED

## 2020-09-21 ENCOUNTER — OFFICE VISIT (OUTPATIENT)
Dept: OTOLARYNGOLOGY | Facility: CLINIC | Age: 50
End: 2020-09-21
Payer: COMMERCIAL

## 2020-09-21 VITALS
WEIGHT: 189.81 LBS | SYSTOLIC BLOOD PRESSURE: 170 MMHG | HEART RATE: 76 BPM | DIASTOLIC BLOOD PRESSURE: 96 MMHG | BODY MASS INDEX: 34.72 KG/M2

## 2020-09-21 DIAGNOSIS — J38.6 SUBGLOTTIC STENOSIS: Primary | ICD-10-CM

## 2020-09-21 DIAGNOSIS — J39.8 TRACHEAL STENOSIS: ICD-10-CM

## 2020-09-21 PROCEDURE — 99213 OFFICE O/P EST LOW 20 MIN: CPT | Mod: 25,S$GLB,, | Performed by: OTOLARYNGOLOGY

## 2020-09-21 PROCEDURE — 99999 PR PBB SHADOW E&M-EST. PATIENT-LVL V: CPT | Mod: PBBFAC,,, | Performed by: OTOLARYNGOLOGY

## 2020-09-21 PROCEDURE — 3008F PR BODY MASS INDEX (BMI) DOCUMENTED: ICD-10-PCS | Mod: CPTII,S$GLB,, | Performed by: OTOLARYNGOLOGY

## 2020-09-21 PROCEDURE — 99213 PR OFFICE/OUTPT VISIT, EST, LEVL III, 20-29 MIN: ICD-10-PCS | Mod: 25,S$GLB,, | Performed by: OTOLARYNGOLOGY

## 2020-09-21 PROCEDURE — 3077F SYST BP >= 140 MM HG: CPT | Mod: CPTII,S$GLB,, | Performed by: OTOLARYNGOLOGY

## 2020-09-21 PROCEDURE — 31575 DIAGNOSTIC LARYNGOSCOPY: CPT | Mod: S$GLB,,, | Performed by: OTOLARYNGOLOGY

## 2020-09-21 PROCEDURE — 99999 PR PBB SHADOW E&M-EST. PATIENT-LVL V: ICD-10-PCS | Mod: PBBFAC,,, | Performed by: OTOLARYNGOLOGY

## 2020-09-21 PROCEDURE — 31575 PR LARYNGOSCOPY, FLEXIBLE; DIAGNOSTIC: ICD-10-PCS | Mod: S$GLB,,, | Performed by: OTOLARYNGOLOGY

## 2020-09-21 PROCEDURE — 3080F DIAST BP >= 90 MM HG: CPT | Mod: CPTII,S$GLB,, | Performed by: OTOLARYNGOLOGY

## 2020-09-21 PROCEDURE — 3077F PR MOST RECENT SYSTOLIC BLOOD PRESSURE >= 140 MM HG: ICD-10-PCS | Mod: CPTII,S$GLB,, | Performed by: OTOLARYNGOLOGY

## 2020-09-21 PROCEDURE — 3080F PR MOST RECENT DIASTOLIC BLOOD PRESSURE >= 90 MM HG: ICD-10-PCS | Mod: CPTII,S$GLB,, | Performed by: OTOLARYNGOLOGY

## 2020-09-21 PROCEDURE — 3008F BODY MASS INDEX DOCD: CPT | Mod: CPTII,S$GLB,, | Performed by: OTOLARYNGOLOGY

## 2020-09-21 RX ORDER — LIDOCAINE HYDROCHLORIDE 10 MG/ML
1 INJECTION, SOLUTION EPIDURAL; INFILTRATION; INTRACAUDAL; PERINEURAL ONCE
Status: CANCELLED | OUTPATIENT
Start: 2020-09-21 | End: 2020-09-21

## 2020-09-21 RX ORDER — DEXAMETHASONE SODIUM PHOSPHATE 4 MG/ML
8 INJECTION, SOLUTION INTRA-ARTICULAR; INTRALESIONAL; INTRAMUSCULAR; INTRAVENOUS; SOFT TISSUE
Status: CANCELLED | OUTPATIENT
Start: 2020-09-21

## 2020-09-21 NOTE — PATIENT INSTRUCTIONS
ACID REFLUX   What is acid reflux?    When we eat, food passes from the throat and into the stomach through a tube called the esophagus. At the bottom of the esophagus is a ring of muscles that acts as a valve between the esophagus and stomach, called the lower esophageal sphincter. Smoking, alcohol, and certain types of food may weaken the sphincter, so it may stop closing properly. The contents in the stomach then may leak back, or reflux, into the esophagus. This problem is called gastroesophageal reflux disease (GERD). Symptoms of GERD include heartburn, belching, regurgitation of stomach contents, and swallowing difficulties.    Sometimes, the stomach acid travels up through the esophagus and spills into the larynx or pharynx (voice box). This is called laryngopharyngeal reflux (LPR) and is irritating to the vocal folds and surrounding tissues. Often, patients with LPR do not experience heartburn as a symptom. More commonly, symptoms of LPR include hoarseness, excessive mucous resulting in frequent throat clearing, post-nasal drip, coughing, throat soreness or burning, choking episodes, difficulty swallowing, and sensation of a lump in the throat.     How is acid reflux treated?   Treatment for acid reflux can involve any combination of medication, lifestyle modifications, and surgery.   · Medications. Your doctor may prescribe a proton pump inhibitor (PPI) or an H2 blocker. If you are prescribed a PPI, take in on an empty stomach in the morning 30 minutes prior to eating breakfast. Keep in mind that it may take 4-6 weeks before symptoms begin to resolve, so do not stop medications without consulting your doctor.   · Lifestyle and dietary modifications. Eat smaller meals at a slower pace. Avoid over-eating. If you are overweight, try to lose weight. Do not lie down or exercise directly after eating; eat your last meal of the day at least 2-3 hours prior to going to sleep. Avoid tight-fitting clothes. If  you are a smoker, reduce or quit smoking. Elevate your head of bed 4-6 inches by putting phone books under the legs at the head of your bed or buy a wedge pillow, but do not use more than two regular pillows as this causes the body to curl and compresses your stomach.     Food group Foods to avoid to reduce reflux   Beverages  Whole milk, 2% milk, chocolate milk/hot chocolate, alcohol, coffee (regular and decaf), caffeinated tea, mint tea, carbonated beverages, citrus juice    Breads/grains Commercial sweet rolls, doughnuts, croissants, and other high-fat pastries    Fruits and vegetables Fried or cream-style vegetables, tomatoes, tomato-based products, citrus fruits, hot peppers    Soups and seasonings Cream, cheese, tomato-based soups, vinegar    Meats and proteins Fatty or fried meat/fish, metzger, sausage, pepperoni, lunch meat, fried eggs    Fats and oil Lard, metzger drippings, salt pork, meat drippings, gravies, highly seasoned salad dressings, nuts    Sweets/desserts Anything made with or from chocolate, peppermint, spearmint, whole milk, or cream; high-fat pastries, gum, hard candy

## 2020-09-21 NOTE — PROGRESS NOTES
[facility]  Department of Otorhinolaryngology and Communication Sciences    Subjective:      Denise Fry is a 50 y.o. female who presents for follow-up. She has idiopathic laryngotracheal stenosis.    TREATMENT HISTORY:  11/13/2018 - SML/TB; CO2 laser; dilation 12 mm; kenalog  4/2019-7/2019 - office steroid injections  12/3/2019 - SML/TB; CO2 laser; dilation 14 mm; kenalog    The patient was doing very well since her last operation until just a couple weeks ago. She has been noticing an increase in her exertional dyspnea levels, especially when she is masked. There have been no changes in her health otherwise since her last visit.     The patient's medications, allergies, past medical, surgical, social and family histories were reviewed and updated as appropriate.    A detailed review of systems was obtained with pertinent positives as per the above HPI, and otherwise negative.      Objective:     BP (!) 170/96 Comment: manual  Pulse 76   Wt 86.1 kg (189 lb 13.1 oz)   LMP 10/03/2019   BMI 34.72 kg/m²      Constitutional: comfortable, well dressed  Psychiatric: appropriate affect  Respiratory: comfortably breathing, symmetric chest rise, no stridor  Voice: normal for age and gender  Head: normocephalic  Eyes: conjunctivae and sclerae clear  Indirect laryngoscopy is limited due to gag    Procedure  Flexible Laryngoscopy (29137): Laryngoscopy is indicated for assessment of upper aerodigestive structure and function. This was carried out transnasally with a distal chip videoendoscope. After verbal consent was obtained, the patient was positioned and the nose was topically decongested with 1% phenylephrine and topically anesthetized with 4% lidocaine. The endoscope was passed through the most patent nasal cavity and positioned to image the nasopharynx, larynx, and hypopharynx in detail. The following features were examined: nasopharyngeal, laryngeal, hypopharyngeal masses; velopharyngeal strength,  closure, and symmetry of motion; vocal fold range and symmetry of motion; laryngeal mucosal edema, erythema, inflammation, and hydration; salivary pooling; and gross laryngeal sensation. The equipment was removed. The patient tolerated the procedure well without complication. All findings were normal except:  - circumferential stenosis beginning at posterior glottis, tracking through subglottis into proximal trachea; persistent posterior glottic edema noted with mild diffuse exudate; interval contracture since last evaluation; airway lumen estimated at 7 mm at its narrowest segment  - right vocal fold with slightly limited lateral excursion and slightly elongated      Assessment:     Denise Fry is a 50 y.o. female with idiopathic laryngotracheal stenosis. Her symptoms and airway exam demonstrate deterioration since her last surgery in 12/2019.      Plan:     I recommended she return to the OR for airway endoscopy, CO2 laser incision/excision, steroid injection, balloon dilation. I reviewed with her the risks/benefits thereof. I gave the patient the opportunity to ask questions and I answered all of them. She wishes to proceed. We will arrange this in the coming weeks, with preop testing triage to be completed by the anesthesiology team.    August Mendoza M.D.  [facility]  Department of Otorhinolaryngology and Communication Sciences

## 2020-09-21 NOTE — H&P (VIEW-ONLY)
[facility]  Department of Otorhinolaryngology and Communication Sciences    Subjective:      Denise Fry is a 50 y.o. female who presents for follow-up. She has idiopathic laryngotracheal stenosis.    TREATMENT HISTORY:  11/13/2018 - SML/TB; CO2 laser; dilation 12 mm; kenalog  4/2019-7/2019 - office steroid injections  12/3/2019 - SML/TB; CO2 laser; dilation 14 mm; kenalog    The patient was doing very well since her last operation until just a couple weeks ago. She has been noticing an increase in her exertional dyspnea levels, especially when she is masked. There have been no changes in her health otherwise since her last visit.     The patient's medications, allergies, past medical, surgical, social and family histories were reviewed and updated as appropriate.    A detailed review of systems was obtained with pertinent positives as per the above HPI, and otherwise negative.      Objective:     BP (!) 170/96 Comment: manual  Pulse 76   Wt 86.1 kg (189 lb 13.1 oz)   LMP 10/03/2019   BMI 34.72 kg/m²      Constitutional: comfortable, well dressed  Psychiatric: appropriate affect  Respiratory: comfortably breathing, symmetric chest rise, no stridor  Voice: normal for age and gender  Head: normocephalic  Eyes: conjunctivae and sclerae clear  Indirect laryngoscopy is limited due to gag    Procedure  Flexible Laryngoscopy (47135): Laryngoscopy is indicated for assessment of upper aerodigestive structure and function. This was carried out transnasally with a distal chip videoendoscope. After verbal consent was obtained, the patient was positioned and the nose was topically decongested with 1% phenylephrine and topically anesthetized with 4% lidocaine. The endoscope was passed through the most patent nasal cavity and positioned to image the nasopharynx, larynx, and hypopharynx in detail. The following features were examined: nasopharyngeal, laryngeal, hypopharyngeal masses; velopharyngeal strength,  closure, and symmetry of motion; vocal fold range and symmetry of motion; laryngeal mucosal edema, erythema, inflammation, and hydration; salivary pooling; and gross laryngeal sensation. The equipment was removed. The patient tolerated the procedure well without complication. All findings were normal except:  - circumferential stenosis beginning at posterior glottis, tracking through subglottis into proximal trachea; persistent posterior glottic edema noted with mild diffuse exudate; interval contracture since last evaluation; airway lumen estimated at 7 mm at its narrowest segment  - right vocal fold with slightly limited lateral excursion and slightly elongated      Assessment:     Denise Fry is a 50 y.o. female with idiopathic laryngotracheal stenosis. Her symptoms and airway exam demonstrate deterioration since her last surgery in 12/2019.      Plan:     I recommended she return to the OR for airway endoscopy, CO2 laser incision/excision, steroid injection, balloon dilation. I reviewed with her the risks/benefits thereof. I gave the patient the opportunity to ask questions and I answered all of them. She wishes to proceed. We will arrange this in the coming weeks, with preop testing triage to be completed by the anesthesiology team.    August Mendoza M.D.  [facility]  Department of Otorhinolaryngology and Communication Sciences

## 2020-09-24 NOTE — ANESTHESIA PAT ROS NOTE
09/24/2020  Denise Fry is a 50 y.o., female.      Pre-op Assessment          Review of Systems  Anesthesia Hx:  Hx of Anesthetic complications POST OP NAUSEA-PATIENT REPORTS SUCCESSFUL TREATMENT WITH MEDICATION BUT UNABLE TO RECALL NAME OF MEDICATION History of prior surgery of interest to airway management or planning: Previous anesthesia: General Suspension microlaryngoscopy, CO2 laser excision, steroid injection, balloon dilation; JET VENTILATION (N/A Throat) 12/0319 with general anesthesia.  Procedure performed at an [facility]. Denies Family Hx of Anesthesia complications.  Personal Hx of Anesthesia complications, Post-Operative Nausea/Vomiting, in the past, but not with recent anesthetics / prophylaxis   Social:  Non-Smoker, Social Alcohol Use    Hematology/Oncology:     Oncology Normal     EENT/Dental:   SUBGLOTTIC & TRACHEAL STENOSIS   Cardiovascular:   Hypertension  Denies Angina. SONG EXERTIONAL DYSPNEA SECONDARY TO SUBGLOTTIC/TRACHEAL STENOSIS Functional Capacity good / => 4 METS    Pulmonary:   Denies Shortness of breath.  Denies Recent URI.    Hepatic/GI:   GERD    Endocrine:  Metabolic Disorders, Obesity / BMI > 30  Psych:   anxiety             Anesthesia Assessment: Preoperative EQUATION    Planned Procedure: Procedure(s) (LRB):  Suspension microlaryngoscopy, CO2 laser excision, steroid injection, balloon dilation; JET VENT (N/A)  Requested Anesthesia Type:General  Surgeon: August Mendoza MD  Service: ENT  Known or anticipated Date of Surgery:10/1/2020    Surgeon notes: reviewed    Electronic QUestionnaire Assessment completed via nurse interview with patient.        Triage considerations:     The patient has no apparent active cardiac condition (No unstable coronary Syndrome such as severe unstable angina or recent [<1 month] myocardial infarction, decompensated CHF, severe  valvular   disease or significant arrhythmia)    Previous anesthesia records:GETA and No problems     *PATIENT REPORTS POST OP NAUSEA, SUCCESSFUL TREATMENT WITH MEDICATION BUT UNABLE TO RECALL THE NAME OF IT*     Airway/Jaw/Neck:  Airway Findings: Mouth Opening: Normal Tongue: Normal  General Airway Assessment: Adult  Mallampati: II  TM Distance: Normal, at least 6 cm  Jaw/Neck Findings    Last PCP note: within 1 month , within [facility]   Subspecialty notes: ENT    Other important co-morbidities: GERD, HTN and Obesity      Tests already available:  Available tests,  within 1 month , within [facility] .9/18: lipid panel, cmp             Instructions given. (See in Nurse's note)    Optimization:  Anesthesia Preop Clinic Assessment  Indicated - NOT INDICATED    Medical Opinion Indicated- NOT INDICATED       Sub-specialist consult indicated:   NOT INDICATED      Plan:    Testing:  EKG     Patient  has previously scheduled Medical Appointment: NOT AT THIS TIME    Navigation: Tests Scheduled.            Results will be tracked by Preop Clinic.

## 2020-09-25 ENCOUNTER — TELEPHONE (OUTPATIENT)
Dept: PREADMISSION TESTING | Facility: HOSPITAL | Age: 50
End: 2020-09-25

## 2020-09-25 NOTE — TELEPHONE ENCOUNTER
----- Message from Nga Hood RN sent at 9/24/2020  1:32 PM CDT -----  10/1 EKG    PLEASE SCHEDULE

## 2020-09-28 ENCOUNTER — CLINICAL SUPPORT (OUTPATIENT)
Dept: CARDIOLOGY | Facility: CLINIC | Age: 50
End: 2020-09-28
Payer: COMMERCIAL

## 2020-09-28 DIAGNOSIS — Z01.818 PRE-OP TESTING: ICD-10-CM

## 2020-09-28 PROCEDURE — 93000 EKG 12-LEAD: ICD-10-PCS | Mod: S$GLB,,, | Performed by: INTERNAL MEDICINE

## 2020-09-28 PROCEDURE — 93000 ELECTROCARDIOGRAM COMPLETE: CPT | Mod: S$GLB,,, | Performed by: INTERNAL MEDICINE

## 2020-09-30 ENCOUNTER — ANESTHESIA EVENT (OUTPATIENT)
Dept: SURGERY | Facility: HOSPITAL | Age: 50
End: 2020-09-30
Payer: COMMERCIAL

## 2020-09-30 ENCOUNTER — TELEPHONE (OUTPATIENT)
Dept: OTOLARYNGOLOGY | Facility: CLINIC | Age: 50
End: 2020-09-30

## 2020-09-30 NOTE — ANESTHESIA PREPROCEDURE EVALUATION
[facility]  Anesthesia Pre-Operative Evaluation         Patient Name: Denise Fry  YOB: 1970  MRN: [id number]    SUBJECTIVE:     Pre-operative evaluation for Procedure(s) (LRB):  Suspension microlaryngoscopy, CO2 laser excision, steroid injection, balloon dilation; JET VENT (N/A)     09/30/2020    Denise Fry is a 50 y.o. female w/ a significant PMHx of HTN, GERD who presents with subglottic stenosis. She has received tracheal dilation x2 in the past, most recent 12/2019. She has had deterioration since her last surgery, experiencing SOB on exertion. Airway exam in clinic indicated that her airway outlet is 7mm at its most narrow point.     Patient now presents for the above procedure(s).      Prev airway: Easy mask with oral airway.    Patient Active Problem List   Diagnosis    Tracheal stenosis    Hypertension    Gastroesophageal reflux disease    Subglottic stenosis    Primary insomnia    Anxiety    Fever blister       Review of patient's allergies indicates:   Allergen Reactions    Codeine        Current Inpatient Medications:      No current facility-administered medications on file prior to encounter.      Current Outpatient Medications on File Prior to Encounter   Medication Sig Dispense Refill    bisoproloL-hydrochlorothiazide (ZIAC) 10-6.25 mg per tablet Take 1 tablet by mouth once daily. 90 tablet 3    escitalopram oxalate (LEXAPRO) 10 MG tablet Take 1 tablet (10 mg total) by mouth once daily. 90 tablet 3    fexofenadine (ALLEGRA) 180 MG tablet Take 180 mg by mouth once daily.      losartan (COZAAR) 25 MG tablet Take 1 tablet (25 mg total) by mouth once daily. 90 tablet 3    omeprazole (PRILOSEC) 40 MG capsule Take 1 capsule (40 mg total) by mouth once daily. 90 capsule 3    valACYclovir (VALTREX) 500 MG tablet TAKE 1 TABLET TWICE A DAY 90 tablet 0    zolpidem (AMBIEN) 10 mg Tab Take 1 tablet (10 mg total) by mouth nightly as needed. 90 tablet  1       Past Surgical History:   Procedure Laterality Date    BREAST BIOPSY Left     negative    CARPAL TUNNEL RELEASE Bilateral      SECTION      DIRECT LARYNGOBRONCHOSCOPY N/A 2018    Procedure: Suspension microlaryngoscopy, CO2 laser excision, steroid injection, balloon dilation.  JET VENTILATION.;  Surgeon: August Mendoza MD;  Location: [facility] OR [address] [place] [address];  Service: ENT;  Laterality: N/A;    DIRECT LARYNGOBRONCHOSCOPY N/A 12/3/2019    Procedure: Suspension microlaryngoscopy, CO2 laser excision, steroid injection, balloon dilation; JET VENTILATION;  Surgeon: August Mendoza MD;  Location: [facility] OR [address] [place] [address];  Service: ENT;  Laterality: N/A;  Microscope, telescopes, tower, airway basic, injector, Kenalog, airway balloons, CO2 laser/micromanipulator; JET VENTILATION, rep conf# [id number]  .    LARYNGOSCOPY      Multiple endoscopic procedures for SGS/tracheal stenosis dating to .    RELEASE OF ULNAR NERVE AT CUBITAL TUNNEL Bilateral        Social History     Socioeconomic History    Marital status:      Spouse name: Not on file    Number of children: 2    Years of education: Not on file    Highest education level: Not on file   Occupational History    Not on file   Social Needs    Financial resource strain: Not hard at all    Food insecurity     Worry: Never true     Inability: Never true    Transportation needs     Medical: No     Non-medical: No   Tobacco Use    Smoking status: Never Smoker    Smokeless tobacco: Never Used   Substance and Sexual Activity    Alcohol use: Yes     Frequency: 2-4 times a month     Drinks per session: 1 or 2     Binge frequency: Never     Comment: occasional    Drug use: No    Sexual activity: Yes     Partners: Male     Birth control/protection: Partner-Vasectomy   Lifestyle    Physical activity     Days per week: 3 days     Minutes per session: 30 min    Stress: Only a little   Relationships    Social connections     Talks on  phone: More than three times a week     Gets together: Twice a week     Attends [employer]ism service: Not on file     Active member of club or organization: No     Attends meetings of clubs or organizations: Never     Relationship status:    Other Topics Concern    Not on file   Social History Narrative    Not on file       OBJECTIVE:     Vital Signs Range (Last 24H):         Significant Labs:  Lab Results   Component Value Date    WBC 5.53 10/10/2019    HGB 13.7 10/10/2019    HCT 44.4 10/10/2019     10/10/2019    CHOL 207 (H) 09/18/2020    TRIG 84 09/18/2020    HDL 74 09/18/2020    ALT 25 09/18/2020    AST 22 09/18/2020     09/18/2020    K 3.9 09/18/2020     09/18/2020    CREATININE 0.8 09/18/2020    BUN 18 09/18/2020    CO2 28 09/18/2020    TSH 1.251 10/10/2019       Diagnostic Studies: No relevant studies.    EKG:   NSR    2D ECHO:  TTE:  No results found for this or any previous visit.    NALDO:  No results found for this or any previous visit.    ASSESSMENT/PLAN:         Anesthesia Evaluation          Review of Systems  Anesthesia Hx:  Hx of Anesthetic complications POST OP NAUSEA-PATIENT REPORTS SUCCESSFUL TREATMENT WITH MEDICATION BUT UNABLE TO RECALL NAME OF MEDICATION History of prior surgery of interest to airway management or planning: Previous anesthesia: General Suspension microlaryngoscopy, CO2 laser excision, steroid injection, balloon dilation; JET VENTILATION (N/A Throat) 12/0319 with general anesthesia. Procedure performed at an [facility]. Denies Family Hx of Anesthesia complications.  Personal Hx of Anesthesia complications, Post-Operative Nausea/Vomiting, in the past, but not with recent anesthetics / prophylaxis   Social:  Non-Smoker, Social Alcohol Use    Hematology/Oncology:     Oncology Normal     EENT/Dental:   SUBGLOTTIC & TRACHEAL STENOSIS   Cardiovascular:   Hypertension  Denies Angina. SONG EXERTIONAL DYSPNEA SECONDARY TO SUBGLOTTIC/TRACHEAL STENOSIS  Functional Capacity good / => 4 METS    Pulmonary:   Denies Shortness of breath.  Denies Recent URI.    Hepatic/GI:   GERD    Endocrine:  Metabolic Disorders, Obesity / BMI > 30  Psych:   anxiety             Anesthesia Plan  Type of Anesthesia, risks & benefits discussed:  Anesthesia Type:  general  Patient's Preference:   Intra-op Monitoring Plan: standard ASA monitors  Intra-op Monitoring Plan Comments:   Post Op Pain Control Plan: multimodal analgesia  Post Op Pain Control Plan Comments:   Induction:   IV  Beta Blocker:  Patient is on a Beta-Blocker and has received one dose within the past 24 hours (No further documentation required).       Informed Consent: Patient understands risks and agrees with Anesthesia plan.  Questions answered. Anesthesia consent signed with patient.  ASA Score: 3     Day of Surgery Review of History & Physical:    H&P update referred to the surgeon.         Ready For Surgery From Anesthesia Perspective.

## 2020-10-01 ENCOUNTER — HOSPITAL ENCOUNTER (OUTPATIENT)
Facility: HOSPITAL | Age: 50
Discharge: HOME | End: 2020-10-01
Attending: OTOLARYNGOLOGY | Admitting: OTOLARYNGOLOGY
Payer: COMMERCIAL

## 2020-10-01 ENCOUNTER — ANESTHESIA (OUTPATIENT)
Dept: SURGERY | Facility: HOSPITAL | Age: 50
End: 2020-10-01
Payer: COMMERCIAL

## 2020-10-01 VITALS
OXYGEN SATURATION: 94 % | RESPIRATION RATE: 20 BRPM | SYSTOLIC BLOOD PRESSURE: 133 MMHG | WEIGHT: 189 LBS | HEIGHT: 62 IN | BODY MASS INDEX: 34.78 KG/M2 | DIASTOLIC BLOOD PRESSURE: 80 MMHG | TEMPERATURE: 98 F | HEART RATE: 80 BPM

## 2020-10-01 DIAGNOSIS — J39.8 TRACHEAL STENOSIS: ICD-10-CM

## 2020-10-01 DIAGNOSIS — J38.6 SUBGLOTTIC STENOSIS: Primary | ICD-10-CM

## 2020-10-01 LAB — SARS-COV-2 RDRP RESP QL NAA+PROBE: NEGATIVE

## 2020-10-01 PROCEDURE — 27201423 OPTIME MED/SURG SUP & DEVICES STERILE SUPPLY: Performed by: OTOLARYNGOLOGY

## 2020-10-01 PROCEDURE — 31571 LARYNGOSCOP W/VC INJ + SCOPE: CPT | Mod: ,,, | Performed by: OTOLARYNGOLOGY

## 2020-10-01 PROCEDURE — 71000044 HC DOSC ROUTINE RECOVERY FIRST HOUR: Performed by: OTOLARYNGOLOGY

## 2020-10-01 PROCEDURE — 31529 LARYNGOSCOPY AND DILATION: CPT | Mod: 51,,, | Performed by: OTOLARYNGOLOGY

## 2020-10-01 PROCEDURE — 37000008 HC ANESTHESIA 1ST 15 MINUTES: Performed by: OTOLARYNGOLOGY

## 2020-10-01 PROCEDURE — D9220A PRA ANESTHESIA: Mod: ,,, | Performed by: ANESTHESIOLOGY

## 2020-10-01 PROCEDURE — 37000009 HC ANESTHESIA EA ADD 15 MINS: Performed by: OTOLARYNGOLOGY

## 2020-10-01 PROCEDURE — 25000003 PHARM REV CODE 250: Performed by: STUDENT IN AN ORGANIZED HEALTH CARE EDUCATION/TRAINING PROGRAM

## 2020-10-01 PROCEDURE — 36000709 HC OR TIME LEV III EA ADD 15 MIN: Performed by: OTOLARYNGOLOGY

## 2020-10-01 PROCEDURE — D9220A PRA ANESTHESIA: ICD-10-PCS | Mod: ,,, | Performed by: ANESTHESIOLOGY

## 2020-10-01 PROCEDURE — 63600175 PHARM REV CODE 636 W HCPCS: Performed by: OTOLARYNGOLOGY

## 2020-10-01 PROCEDURE — S0028 INJECTION, FAMOTIDINE, 20 MG: HCPCS | Performed by: STUDENT IN AN ORGANIZED HEALTH CARE EDUCATION/TRAINING PROGRAM

## 2020-10-01 PROCEDURE — 63600175 PHARM REV CODE 636 W HCPCS: Performed by: STUDENT IN AN ORGANIZED HEALTH CARE EDUCATION/TRAINING PROGRAM

## 2020-10-01 PROCEDURE — 31529 PR LARYNGOSCOPY DIRECT,W/DILAT, SUBSEQUENT: ICD-10-PCS | Mod: 51,,, | Performed by: OTOLARYNGOLOGY

## 2020-10-01 PROCEDURE — 71000015 HC POSTOP RECOV 1ST HR: Performed by: OTOLARYNGOLOGY

## 2020-10-01 PROCEDURE — C1726 CATH, BAL DIL, NON-VASCULAR: HCPCS | Performed by: OTOLARYNGOLOGY

## 2020-10-01 PROCEDURE — U0002 COVID-19 LAB TEST NON-CDC: HCPCS

## 2020-10-01 PROCEDURE — 31571 PR LARYNGOSCOPY,DIRECT,SCOPE,INJ CORDS: ICD-10-PCS | Mod: ,,, | Performed by: OTOLARYNGOLOGY

## 2020-10-01 PROCEDURE — 36000708 HC OR TIME LEV III 1ST 15 MIN: Performed by: OTOLARYNGOLOGY

## 2020-10-01 PROCEDURE — 25000003 PHARM REV CODE 250: Performed by: OTOLARYNGOLOGY

## 2020-10-01 RX ORDER — LIDOCAINE HYDROCHLORIDE 10 MG/ML
1 INJECTION, SOLUTION EPIDURAL; INFILTRATION; INTRACAUDAL; PERINEURAL ONCE
Status: DISCONTINUED | OUTPATIENT
Start: 2020-10-01 | End: 2020-10-01 | Stop reason: HOSPADM

## 2020-10-01 RX ORDER — DEXAMETHASONE SODIUM PHOSPHATE 4 MG/ML
8 INJECTION, SOLUTION INTRA-ARTICULAR; INTRALESIONAL; INTRAMUSCULAR; INTRAVENOUS; SOFT TISSUE
Status: DISCONTINUED | OUTPATIENT
Start: 2020-10-01 | End: 2020-10-01 | Stop reason: HOSPADM

## 2020-10-01 RX ORDER — ROCURONIUM BROMIDE 10 MG/ML
INJECTION, SOLUTION INTRAVENOUS
Status: DISCONTINUED | OUTPATIENT
Start: 2020-10-01 | End: 2020-10-01

## 2020-10-01 RX ORDER — MIDAZOLAM HYDROCHLORIDE 1 MG/ML
INJECTION, SOLUTION INTRAMUSCULAR; INTRAVENOUS
Status: DISCONTINUED | OUTPATIENT
Start: 2020-10-01 | End: 2020-10-01

## 2020-10-01 RX ORDER — FENTANYL CITRATE 50 UG/ML
25 INJECTION, SOLUTION INTRAMUSCULAR; INTRAVENOUS EVERY 5 MIN PRN
Status: DISCONTINUED | OUTPATIENT
Start: 2020-10-01 | End: 2020-10-01 | Stop reason: HOSPADM

## 2020-10-01 RX ORDER — ACETAMINOPHEN 325 MG/1
650 TABLET ORAL EVERY 4 HOURS PRN
Status: DISCONTINUED | OUTPATIENT
Start: 2020-10-01 | End: 2020-10-01 | Stop reason: HOSPADM

## 2020-10-01 RX ORDER — ONDANSETRON 2 MG/ML
INJECTION INTRAMUSCULAR; INTRAVENOUS
Status: DISCONTINUED | OUTPATIENT
Start: 2020-10-01 | End: 2020-10-01

## 2020-10-01 RX ORDER — EPINEPHRINE 1 MG/ML
INJECTION, SOLUTION INTRACARDIAC; INTRAMUSCULAR; INTRAVENOUS; SUBCUTANEOUS
Status: DISCONTINUED | OUTPATIENT
Start: 2020-10-01 | End: 2020-10-01 | Stop reason: HOSPADM

## 2020-10-01 RX ORDER — FAMOTIDINE 10 MG/ML
INJECTION INTRAVENOUS
Status: DISCONTINUED | OUTPATIENT
Start: 2020-10-01 | End: 2020-10-01

## 2020-10-01 RX ORDER — SODIUM CHLORIDE 9 MG/ML
INJECTION, SOLUTION INTRAVENOUS CONTINUOUS PRN
Status: DISCONTINUED | OUTPATIENT
Start: 2020-10-01 | End: 2020-10-01

## 2020-10-01 RX ORDER — FENTANYL CITRATE 50 UG/ML
INJECTION, SOLUTION INTRAMUSCULAR; INTRAVENOUS
Status: DISCONTINUED | OUTPATIENT
Start: 2020-10-01 | End: 2020-10-01

## 2020-10-01 RX ORDER — PROPOFOL 10 MG/ML
VIAL (ML) INTRAVENOUS
Status: DISCONTINUED | OUTPATIENT
Start: 2020-10-01 | End: 2020-10-01

## 2020-10-01 RX ORDER — LABETALOL HYDROCHLORIDE 5 MG/ML
INJECTION, SOLUTION INTRAVENOUS
Status: DISCONTINUED | OUTPATIENT
Start: 2020-10-01 | End: 2020-10-01

## 2020-10-01 RX ORDER — OXYMETAZOLINE HCL 0.05 %
SPRAY, NON-AEROSOL (ML) NASAL
Status: DISCONTINUED | OUTPATIENT
Start: 2020-10-01 | End: 2020-10-01 | Stop reason: HOSPADM

## 2020-10-01 RX ORDER — TRIAMCINOLONE ACETONIDE 40 MG/ML
INJECTION, SUSPENSION INTRA-ARTICULAR; INTRAMUSCULAR
Status: DISCONTINUED | OUTPATIENT
Start: 2020-10-01 | End: 2020-10-01 | Stop reason: HOSPADM

## 2020-10-01 RX ORDER — SODIUM CHLORIDE 0.9 % (FLUSH) 0.9 %
10 SYRINGE (ML) INJECTION
Status: DISCONTINUED | OUTPATIENT
Start: 2020-10-01 | End: 2020-10-01 | Stop reason: HOSPADM

## 2020-10-01 RX ORDER — DEXAMETHASONE SODIUM PHOSPHATE 4 MG/ML
INJECTION, SOLUTION INTRA-ARTICULAR; INTRALESIONAL; INTRAMUSCULAR; INTRAVENOUS; SOFT TISSUE
Status: DISCONTINUED | OUTPATIENT
Start: 2020-10-01 | End: 2020-10-01

## 2020-10-01 RX ORDER — PROPOFOL 10 MG/ML
VIAL (ML) INTRAVENOUS CONTINUOUS PRN
Status: DISCONTINUED | OUTPATIENT
Start: 2020-10-01 | End: 2020-10-01

## 2020-10-01 RX ORDER — LIDOCAINE HYDROCHLORIDE 40 MG/ML
INJECTION, SOLUTION RETROBULBAR
Status: DISCONTINUED | OUTPATIENT
Start: 2020-10-01 | End: 2020-10-01 | Stop reason: HOSPADM

## 2020-10-01 RX ORDER — KETAMINE HCL IN 0.9 % NACL 50 MG/5 ML
SYRINGE (ML) INTRAVENOUS
Status: DISCONTINUED | OUTPATIENT
Start: 2020-10-01 | End: 2020-10-01

## 2020-10-01 RX ORDER — LIDOCAINE HYDROCHLORIDE 20 MG/ML
INJECTION INTRAVENOUS
Status: DISCONTINUED | OUTPATIENT
Start: 2020-10-01 | End: 2020-10-01

## 2020-10-01 RX ORDER — ONDANSETRON 2 MG/ML
4 INJECTION INTRAMUSCULAR; INTRAVENOUS ONCE AS NEEDED
Status: DISCONTINUED | OUTPATIENT
Start: 2020-10-01 | End: 2020-10-01 | Stop reason: HOSPADM

## 2020-10-01 RX ADMIN — PROPOFOL 30 MG: 10 INJECTION, EMULSION INTRAVENOUS at 09:10

## 2020-10-01 RX ADMIN — PROPOFOL 200 MCG/KG/MIN: 10 INJECTION, EMULSION INTRAVENOUS at 09:10

## 2020-10-01 RX ADMIN — FAMOTIDINE 20 MG: 10 INJECTION, SOLUTION INTRAVENOUS at 09:10

## 2020-10-01 RX ADMIN — FENTANYL CITRATE 50 MCG: 50 INJECTION, SOLUTION INTRAMUSCULAR; INTRAVENOUS at 09:10

## 2020-10-01 RX ADMIN — SUGAMMADEX 400 MG: 100 INJECTION, SOLUTION INTRAVENOUS at 09:10

## 2020-10-01 RX ADMIN — PROPOFOL 20 MG: 10 INJECTION, EMULSION INTRAVENOUS at 09:10

## 2020-10-01 RX ADMIN — LABETALOL HYDROCHLORIDE 10 MG: 5 INJECTION, SOLUTION INTRAVENOUS at 09:10

## 2020-10-01 RX ADMIN — LIDOCAINE HYDROCHLORIDE 80 MG: 20 INJECTION, SOLUTION INTRAVENOUS at 09:10

## 2020-10-01 RX ADMIN — PROPOFOL 150 MG: 10 INJECTION, EMULSION INTRAVENOUS at 09:10

## 2020-10-01 RX ADMIN — SODIUM CHLORIDE: 0.9 INJECTION, SOLUTION INTRAVENOUS at 09:10

## 2020-10-01 RX ADMIN — MIDAZOLAM HYDROCHLORIDE 2 MG: 1 INJECTION, SOLUTION INTRAMUSCULAR; INTRAVENOUS at 09:10

## 2020-10-01 RX ADMIN — ROCURONIUM BROMIDE 50 MG: 10 INJECTION, SOLUTION INTRAVENOUS at 09:10

## 2020-10-01 RX ADMIN — ONDANSETRON 4 MG: 2 INJECTION, SOLUTION INTRAMUSCULAR; INTRAVENOUS at 09:10

## 2020-10-01 RX ADMIN — DEXAMETHASONE SODIUM PHOSPHATE 8 MG: 4 INJECTION, SOLUTION INTRAMUSCULAR; INTRAVENOUS at 09:10

## 2020-10-01 RX ADMIN — Medication 20 MG: at 09:10

## 2020-10-01 NOTE — BRIEF OP NOTE
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    DISPOSITION: Home or Self Care    FINAL DIAGNOSIS:  Subglottic stenosis    FOLLOWUP: In clinic    DISCHARGE INSTRUCTIONS:    Discharge Procedure Orders   Diet general     Lifting restrictions     Call MD for:  temperature >100.4     Call MD for:  persistent nausea and vomiting     Call MD for:  severe uncontrolled pain     Call MD for:  difficulty breathing, headache or visual disturbances     Call MD for:  redness, tenderness, or signs of infection (pain, swelling, redness, odor or green/yellow discharge around incision site)     Call MD for:  hives     Call MD for:  persistent dizziness or light-headedness     Call MD for:  extreme fatigue     No dressing needed     Activity as tolerated

## 2020-10-01 NOTE — OP NOTE
DATE OF PROCEDURE:  10/1/2020     PREOPERATIVE DIAGNOSIS:  Idiopathic laryngotracheal stenosis.     POSTOPERATIVE DIAGNOSIS:  Idiopathic laryngotracheal stenosis.     PROCEDURES PERFORMED:  1.  Suspension microlaryngoscopy with rigid tracheobronchoscopy.  2.  CO2 laser excision of laryngotracheal stenosis.  3.  Balloon dilation to 16 mm.  4.  Intralesional injection of Kenalog 40, 1 mL.     SURGEON:  August Mendoza M.D.     ASSISTANT:  Sasha Obrien M.D. (RES)     ANESTHESIA:  General, jet ventilation.     COMPLICATIONS:  None.     SPECIMENS:  None.     BLOOD LOSS:  Less than 5 mL.     FINDINGS:  1.  The patient is a straightforward exposure using an Ossoff-Pilling   subglottiscope.  2.  Corkscrewing stenosis is present, beginning in the posterior glottis,   tracking through the subglottis into the proximal trachea, with the airway lumen   narrowest at its most distal end, where it was estimated at approximately 5 mm.    The lumen is primarily right-sided.  The total length of the stenotic segment is   estimated at 2 cm.      INDICATIONS FOR PROCEDURE:  The patient is a 50-year-old lady with idiopathic   laryngotracheal stenosis, primarily centered in the subglottis and trachea,   though with some encroachment into the posterior glottis.  She has undergone   multiple prior endoscopic procedures in [place], the last of which was in December 2016, followed by endoscopic surgery by me in 2018 and 2019. She has been experiencing gradual worsening of her symptoms over the last   several months.  She presents today for endoscopic airway intervention.  Prior   to procedure, the risks, benefits and options were discussed at length with the   patient.  Risks included but were not limited to pain, bleeding, infection,   scar, recurrence, worsening of voice, worsening of swallowing, aspiration,   damage to the lips, teeth, gums or tongue, tongue numbness, taste disturbance,   laser injury including vision loss and airway fire,  risks of general anesthesia,   pneumomediastinum, pneumothorax, laryngotracheal rupture, pharyngoesophageal or   tracheoesophageal leak, fistula.  In spite of the risks of surgery, the patient   desired to move forward with the procedure.  Informed consent was obtained.     PROCEDURE IN DETAIL:  The patient was positively identified in the preoperative   holding area.  She was brought to the Operating Room and was placed in a flat   supine position.  Final timeout was performed for verification purposes.  IV   anesthesia induction was administered.  After confirmation of adequate mask   ventilation, IV muscle relaxation was administered.  Mask ventilation continued.    The eyes were protected with moist sponges.  The upper dentition was protected   with a reinforced mouthguard.  The Ossoff-Pilling subglottiscope was lubricated   and was inserted into the oral cavity via the right lingual gutter.  It was   utilized to first inspect the endolarynx and then to expose the subglottis.  The airway was   topically anesthetized with 3 mL of 4% lidocaine.  The patient was suspended   from the Farris  the usual fashion.  Jet ventilation was initiated via the   subglottiscope.  Adequate chest rise and fall were noted.  Over the course of   the case driving pressure varied from 25 to 45 and the frequency of ventilation   remained at 40.  Next, magnified laryngotracheal bronchoscopy was   performed with the 0-degree Muse richard telescope connected to a video monitor.    Findings were as noted above.  Once beyond the approximately 2 cm stenotic   segment, the remainder of the trachea and proximal mainstem bronchi were noted   to be normal.    Attention was turned to the CO2 laser relief of stenosis.   The fraction of inspired oxygen was maintained at less than 30% during all   periods of laser use.  All Operating Room personnel were equipped with   laser-safe eyewear.  All exposed regions of the patient's skin were  protected   with moist towels.  Under the maximal magnification of the operating microscope,   the CO2 laser, with settings of up to 10 zamora ultrapulse, was used to perform first a right sided relaxing incision. The stenotic segment was noted to be firm and friable.   During a period of apnea, the 14 mm radial dilation balloon catheter was advanced   into the subglottis.  It was inflated to its   maximal working pressure of 10 atmospheres for duration of 30 seconds.    Thereafter, the balloon was deflated and was removed.  The laryngoscope was   again positioned into the subglottis.  Jet ventilation continued.  There was   relief in the extent of airway obstruction and improvement in the   ease of ventilation.  The airway was suctioned with the rigid bronchoscopic   suction.  Magnified tracheobronchoscopy was once more performed with the 0 degree telescope.    This demonstrated improvement in the caliber of the airway.   Next, an additional wedge relaxing excision of the left posterior quadrant of stenosis was performed.   During another brief apneic period, the 16 mm radial dilation balloon catheter was advanced   into the subglottis.  It was inflated to its   maximal working pressure of 8 atmospheres for duration of 30 seconds.    Thereafter, the balloon was deflated and was removed.  Jet ventilation continued.  There was   further relief in the extent of airway obstruction and improvement in the   ease of ventilation.  The airway was suctioned with the rigid bronchoscopic   suction.  Magnified tracheobronchoscopy was once more performed with the 0 degree telescope.    There was marked  further relief in the extent of airway obstruction and improvement in the   ease of ventilation.  The diseased segment was flush with the native airway walls. The airway was suctioned with the rigid bronchoscopic   suction.   Finally, a solution of   Kenalog 40 was loaded into the laryngotracheal injector needle and was used to  inject   the stenotic segment in a circumferential fashion.  This was carried up from the trachea into the subglottis and   posterior glottis, with total volume administered 1.0 mL.   Jet ventilation ceased.  All equipment was removed from the mouth.  The patient was transitioned back to mask ventilation and was thereafter handed back over to the Anesthesiology team for   emergence from anesthesia, which was uneventful.  Thereafter, the patient was   escorted to the Recovery Room in good condition.  The patient tolerated the   procedure well.  There were no complications.  All needle, sponge and instrument   counts were correct at the end of the case.     ATTESTATION:  I, as the attending of record, was present and participated in all   portions of this procedure.

## 2020-10-01 NOTE — ANESTHESIA POSTPROCEDURE EVALUATION
Anesthesia Post Evaluation    Patient: Denise Fry    Procedure(s) Performed: Procedure(s) (LRB):  Suspension microlaryngoscopy, CO2 laser excision, steroid injection, balloon dilation; JET VENT (N/A)    Final Anesthesia Type: general    Patient location during evaluation: PACU  Patient participation: Yes- Able to Participate  Level of consciousness: awake and alert and oriented  Post-procedure vital signs: reviewed and stable  Pain management: adequate  Airway patency: patent    PONV status at discharge: No PONV  Anesthetic complications: no      Cardiovascular status: hemodynamically stable  Respiratory status: unassisted and spontaneous ventilation  Hydration status: euvolemic  Follow-up not needed.          Vitals Value Taken Time   /80 10/01/20 1047   Temp 36.6 °C (97.9 °F) 10/01/20 1045   Pulse 77 10/01/20 1049   Resp 15 10/01/20 1049   SpO2 95 % 10/01/20 1049   Vitals shown include unvalidated device data.      No case tracking events are documented in the log.      Pain/Roger Score: Roger Score: 10 (10/1/2020 10:45 AM)

## 2020-10-01 NOTE — INTERVAL H&P NOTE
The patient has been examined and the H&P has been reviewed:    I concur with the findings and no changes have occurred since H&P was written.    Surgery risks, benefits and alternative options discussed and understood by patient/family.          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

## 2020-10-01 NOTE — TRANSFER OF CARE
"Anesthesia Transfer of Care Note    Patient: Denise Fry    Procedure(s) Performed: Procedure(s) (LRB):  Suspension microlaryngoscopy, CO2 laser excision, steroid injection, balloon dilation; JET VENT (N/A)    Patient location: PACU    Anesthesia Type: general    Transport from OR: Transported from OR on 6-10 L/min O2 by face mask with adequate spontaneous ventilation    Post pain: adequate analgesia    Post assessment: no apparent anesthetic complications and tolerated procedure well    Post vital signs: stable    Level of consciousness: awake and alert    Nausea/Vomiting: no nausea/vomiting    Complications: none    Transfer of care protocol was followed      Last vitals:   Visit Vitals  BP (!) 194/90 (BP Location: Right arm, Patient Position: Lying)   Pulse 85   Temp 37 °C (98.6 °F) (Oral)   Resp 17   Ht 5' 2" (1.575 m)   Wt 85.7 kg (189 lb)   LMP 10/03/2019   SpO2 99%   Breastfeeding No   BMI 34.57 kg/m²     "

## 2020-10-05 DIAGNOSIS — Z03.818 ENCOUNTER FOR OBSERVATION FOR SUSPECTED EXPOSURE TO OTHER BIOLOGICAL AGENTS RULED OUT: ICD-10-CM

## 2020-11-01 ENCOUNTER — PATIENT MESSAGE (OUTPATIENT)
Dept: FAMILY MEDICINE | Facility: CLINIC | Age: 50
End: 2020-11-01

## 2020-11-02 ENCOUNTER — PATIENT MESSAGE (OUTPATIENT)
Dept: FAMILY MEDICINE | Facility: CLINIC | Age: 50
End: 2020-11-02

## 2020-11-03 ENCOUNTER — LAB VISIT (OUTPATIENT)
Dept: FAMILY MEDICINE | Facility: CLINIC | Age: 50
End: 2020-11-03
Payer: COMMERCIAL

## 2020-11-03 DIAGNOSIS — Z03.818 ENCOUNTER FOR OBSERVATION FOR SUSPECTED EXPOSURE TO OTHER BIOLOGICAL AGENTS RULED OUT: ICD-10-CM

## 2020-11-03 PROCEDURE — U0003 INFECTIOUS AGENT DETECTION BY NUCLEIC ACID (DNA OR RNA); SEVERE ACUTE RESPIRATORY SYNDROME CORONAVIRUS 2 (SARS-COV-2) (CORONAVIRUS DISEASE [COVID-19]), AMPLIFIED PROBE TECHNIQUE, MAKING USE OF HIGH THROUGHPUT TECHNOLOGIES AS DESCRIBED BY CMS-2020-01-R: HCPCS

## 2020-11-04 LAB — SARS-COV-2 RNA RESP QL NAA+PROBE: NOT DETECTED

## 2020-11-05 ENCOUNTER — OFFICE VISIT (OUTPATIENT)
Dept: OTOLARYNGOLOGY | Facility: CLINIC | Age: 50
End: 2020-11-05
Payer: COMMERCIAL

## 2020-11-05 VITALS
DIASTOLIC BLOOD PRESSURE: 82 MMHG | WEIGHT: 193.13 LBS | SYSTOLIC BLOOD PRESSURE: 153 MMHG | HEART RATE: 74 BPM | BODY MASS INDEX: 35.32 KG/M2

## 2020-11-05 DIAGNOSIS — J39.8 TRACHEAL STENOSIS: ICD-10-CM

## 2020-11-05 DIAGNOSIS — Z03.818 ENCOUNTER FOR OBSERVATION FOR SUSPECTED EXPOSURE TO OTHER BIOLOGICAL AGENTS RULED OUT: ICD-10-CM

## 2020-11-05 DIAGNOSIS — J38.6 SUBGLOTTIC STENOSIS: Primary | ICD-10-CM

## 2020-11-05 PROCEDURE — 3079F DIAST BP 80-89 MM HG: CPT | Mod: CPTII,S$GLB,, | Performed by: OTOLARYNGOLOGY

## 2020-11-05 PROCEDURE — 3008F BODY MASS INDEX DOCD: CPT | Mod: CPTII,S$GLB,, | Performed by: OTOLARYNGOLOGY

## 2020-11-05 PROCEDURE — 99213 OFFICE O/P EST LOW 20 MIN: CPT | Mod: 25,S$GLB,, | Performed by: OTOLARYNGOLOGY

## 2020-11-05 PROCEDURE — 31575 PR LARYNGOSCOPY, FLEXIBLE; DIAGNOSTIC: ICD-10-PCS | Mod: S$GLB,,, | Performed by: OTOLARYNGOLOGY

## 2020-11-05 PROCEDURE — 3008F PR BODY MASS INDEX (BMI) DOCUMENTED: ICD-10-PCS | Mod: CPTII,S$GLB,, | Performed by: OTOLARYNGOLOGY

## 2020-11-05 PROCEDURE — 3077F SYST BP >= 140 MM HG: CPT | Mod: CPTII,S$GLB,, | Performed by: OTOLARYNGOLOGY

## 2020-11-05 PROCEDURE — 99213 PR OFFICE/OUTPT VISIT, EST, LEVL III, 20-29 MIN: ICD-10-PCS | Mod: 25,S$GLB,, | Performed by: OTOLARYNGOLOGY

## 2020-11-05 PROCEDURE — 3079F PR MOST RECENT DIASTOLIC BLOOD PRESSURE 80-89 MM HG: ICD-10-PCS | Mod: CPTII,S$GLB,, | Performed by: OTOLARYNGOLOGY

## 2020-11-05 PROCEDURE — 31575 DIAGNOSTIC LARYNGOSCOPY: CPT | Mod: S$GLB,,, | Performed by: OTOLARYNGOLOGY

## 2020-11-05 PROCEDURE — 3077F PR MOST RECENT SYSTOLIC BLOOD PRESSURE >= 140 MM HG: ICD-10-PCS | Mod: CPTII,S$GLB,, | Performed by: OTOLARYNGOLOGY

## 2020-11-05 PROCEDURE — 99999 PR PBB SHADOW E&M-EST. PATIENT-LVL III: ICD-10-PCS | Mod: PBBFAC,,, | Performed by: OTOLARYNGOLOGY

## 2020-11-05 PROCEDURE — 99999 PR PBB SHADOW E&M-EST. PATIENT-LVL III: CPT | Mod: PBBFAC,,, | Performed by: OTOLARYNGOLOGY

## 2020-11-10 ENCOUNTER — PATIENT MESSAGE (OUTPATIENT)
Dept: OTOLARYNGOLOGY | Facility: CLINIC | Age: 50
End: 2020-11-10

## 2020-11-11 DIAGNOSIS — B00.1 FEVER BLISTER: ICD-10-CM

## 2020-11-11 RX ORDER — VALACYCLOVIR HYDROCHLORIDE 500 MG/1
TABLET, FILM COATED ORAL
Qty: 180 TABLET | Refills: 1 | Status: SHIPPED | OUTPATIENT
Start: 2020-11-11

## 2020-11-11 NOTE — PROGRESS NOTES
Refill Authorization Note   Denise Fry is requesting a refill authorization.  Brief assessment and rationale for refill:   Approve  Quick Discontinue     Medication Therapy Plan: CDMR. CBC waived for PRN meds;     Medication reconciliation completed: No   Comments:   Orders Placed This Encounter    valACYclovir (VALTREX) 500 MG tablet      Requested Prescriptions   Signed Prescriptions Disp Refills    valACYclovir (VALTREX) 500 MG tablet 180 tablet 1     Sig: TAKE 1 TABLET TWICE A DAY       Antimicrobials:  Oral Antiviral Agents - Anti-Herpetic Failed - 11/11/2020  8:04 AM        Failed - WBC in normal range and within 360 days     WBC   Date Value Ref Range Status   10/10/2019 5.53 3.90 - 12.70 K/uL Final              Passed - Patient is at least 18 years old        Passed - Office visit in past 12 months or future 90 days     Recent Outpatient Visits            6 days ago Subglottic stenosis    [facility] August Mendoza MD    1 month ago Subglottic stenosis    [facility] August Mendoza MD    1 month ago Wellness examination    [facility] ANTONELLA Chaidez MD    6 months ago Gastroesophageal reflux disease without esophagitis    [facility]    9 months ago Subglottic stenosis    [facility] August Mendoza MD          Future Appointments              In 1 month ANTONELLA Chaidez MD [facility]    In 1 month COVID TESTING, [facility]    In 1 month August Mendoza MD [facility], [facility]                Passed - Cr is 1.4 or below and within 360 days     Creatinine   Date Value Ref Range Status   09/18/2020 0.8 0.5 - 1.4 mg/dL Final   11/19/2019 0.8 0.5 - 1.4 mg/dL Final   01/03/2019 0.8 0.5 - 1.4 mg/dL Final              Passed - eGFR within 360 days     eGFR if non    Date  Value Ref Range Status   09/18/2020 >60.0 >60 mL/min/1.73 m^2 Final     Comment:     Calculation used to obtain the estimated glomerular filtration  rate (eGFR) is the CKD-EPI equation.      11/19/2019 >60 >60 mL/min/1.73 m^2 Final     Comment:     Calculation used to obtain the estimated glomerular filtration  rate (eGFR) is the CKD-EPI equation.      01/03/2019 >60.0 >60 mL/min/1.73 m^2 Final     Comment:     Calculation used to obtain the estimated glomerular filtration  rate (eGFR) is the CKD-EPI equation.        eGFR if    Date Value Ref Range Status   09/18/2020 >60.0 >60 mL/min/1.73 m^2 Final   11/19/2019 >60 >60 mL/min/1.73 m^2 Final   01/03/2019 >60.0 >60 mL/min/1.73 m^2 Final                  Appointments  past 12m or future 3m with PCP    Date Provider   Last Visit   9/18/2020 ANTONELLA Chaidez MD   Next Visit   12/18/2020 ANTONELLA Chaidez MD   ED visits in past 90 days: 0     Note composed:11:24 AM 11/11/2020

## 2020-11-11 NOTE — TELEPHONE ENCOUNTER
Care Due:                  Date            Visit Type   Department     Provider  --------------------------------------------------------------------------------                                MYCHART                              FOLLOWUP/OF  [facility] FAMILY  Last Visit: 09-      FICE VISIT   MEDICINE       ANTONELLA LAW                                           [facility]  Next Visit: 12-      None         [facility]       ANTONELLA LAW                                                            Last  Test          Frequency    Reason                     Performed    Due Date  --------------------------------------------------------------------------------    CBC.........  12 months..  valACYclovir.............  10-   10-    Powered by [employer]. Reference number: [id number]. 11/11/2020 8:04:24 AM CST

## 2020-12-04 ENCOUNTER — PATIENT OUTREACH (OUTPATIENT)
Dept: ADMINISTRATIVE | Facility: HOSPITAL | Age: 50
End: 2020-12-04

## 2020-12-04 NOTE — PROGRESS NOTES
2020 Care Everywhere updates requested and reviewed.  Immunizations reconciled. Media reports reviewed.  Duplicate HM overrides and  orders removed.  Overdue HM topic chart audit and/or requested.  Overdue lab testing linked to upcoming lab appointments if applies.    Lab [facility], and [facility] reviewed.  Message sent to patient's portal.      Health Maintenance Due   Topic Date Due    Hepatitis C Screening  1970    HIV Screening  1983    Shingles Vaccine (1 of 2) 2018    Cervical Cancer Screening  11/15/2020

## 2021-01-20 DIAGNOSIS — Z12.31 OTHER SCREENING MAMMOGRAM: ICD-10-CM

## 2021-03-03 ENCOUNTER — IMMUNIZATION (OUTPATIENT)
Dept: FAMILY MEDICINE | Facility: CLINIC | Age: 51
End: 2021-03-03
Payer: COMMERCIAL

## 2021-03-03 DIAGNOSIS — Z23 NEED FOR VACCINATION: Primary | ICD-10-CM

## 2021-03-03 PROCEDURE — 0031A COVID-19,VECTOR-NR,RS-AD26,PF,0.5 ML DOSE VACCINE (J&J): CPT | Mod: PBBFAC | Performed by: FAMILY MEDICINE

## 2021-03-31 ENCOUNTER — PATIENT OUTREACH (OUTPATIENT)
Dept: ADMINISTRATIVE | Facility: OTHER | Age: 51
End: 2021-03-31

## 2021-03-31 ENCOUNTER — HOSPITAL ENCOUNTER (OUTPATIENT)
Dept: RADIOLOGY | Facility: HOSPITAL | Age: 51
Discharge: HOME | End: 2021-03-31
Attending: FAMILY MEDICINE
Payer: COMMERCIAL

## 2021-03-31 DIAGNOSIS — Z12.31 OTHER SCREENING MAMMOGRAM: ICD-10-CM

## 2021-03-31 PROCEDURE — 77067 SCR MAMMO BI INCL CAD: CPT | Mod: TC,PO

## 2021-03-31 PROCEDURE — 77063 MAMMO DIGITAL SCREENING BILAT WITH TOMO: ICD-10-PCS | Mod: 26,,, | Performed by: RADIOLOGY

## 2021-03-31 PROCEDURE — 77067 MAMMO DIGITAL SCREENING BILAT WITH TOMO: ICD-10-PCS | Mod: 26,,, | Performed by: RADIOLOGY

## 2021-03-31 PROCEDURE — 77063 BREAST TOMOSYNTHESIS BI: CPT | Mod: 26,,, | Performed by: RADIOLOGY

## 2021-03-31 PROCEDURE — 77067 SCR MAMMO BI INCL CAD: CPT | Mod: 26,,, | Performed by: RADIOLOGY

## 2021-04-01 ENCOUNTER — OFFICE VISIT (OUTPATIENT)
Dept: OTOLARYNGOLOGY | Facility: CLINIC | Age: 51
End: 2021-04-01
Payer: COMMERCIAL

## 2021-04-01 VITALS — DIASTOLIC BLOOD PRESSURE: 93 MMHG | SYSTOLIC BLOOD PRESSURE: 161 MMHG | HEART RATE: 80 BPM

## 2021-04-01 DIAGNOSIS — J38.6 SUBGLOTTIC STENOSIS: Primary | ICD-10-CM

## 2021-04-01 DIAGNOSIS — J39.8 TRACHEAL STENOSIS: ICD-10-CM

## 2021-04-01 PROCEDURE — 3080F PR MOST RECENT DIASTOLIC BLOOD PRESSURE >= 90 MM HG: ICD-10-PCS | Mod: CPTII,S$GLB,, | Performed by: OTOLARYNGOLOGY

## 2021-04-01 PROCEDURE — 3077F PR MOST RECENT SYSTOLIC BLOOD PRESSURE >= 140 MM HG: ICD-10-PCS | Mod: CPTII,S$GLB,, | Performed by: OTOLARYNGOLOGY

## 2021-04-01 PROCEDURE — 1126F AMNT PAIN NOTED NONE PRSNT: CPT | Mod: S$GLB,,, | Performed by: OTOLARYNGOLOGY

## 2021-04-01 PROCEDURE — 1126F PR PAIN SEVERITY QUANTIFIED, NO PAIN PRESENT: ICD-10-PCS | Mod: S$GLB,,, | Performed by: OTOLARYNGOLOGY

## 2021-04-01 PROCEDURE — 99999 PR PBB SHADOW E&M-EST. PATIENT-LVL IV: CPT | Mod: PBBFAC,,, | Performed by: OTOLARYNGOLOGY

## 2021-04-01 PROCEDURE — 3080F DIAST BP >= 90 MM HG: CPT | Mod: CPTII,S$GLB,, | Performed by: OTOLARYNGOLOGY

## 2021-04-01 PROCEDURE — 99214 PR OFFICE/OUTPT VISIT, EST, LEVL IV, 30-39 MIN: ICD-10-PCS | Mod: 25,S$GLB,, | Performed by: OTOLARYNGOLOGY

## 2021-04-01 PROCEDURE — 3077F SYST BP >= 140 MM HG: CPT | Mod: CPTII,S$GLB,, | Performed by: OTOLARYNGOLOGY

## 2021-04-01 PROCEDURE — 99214 OFFICE O/P EST MOD 30 MIN: CPT | Mod: 25,S$GLB,, | Performed by: OTOLARYNGOLOGY

## 2021-04-01 PROCEDURE — 31575 PR LARYNGOSCOPY, FLEXIBLE; DIAGNOSTIC: ICD-10-PCS | Mod: S$GLB,,, | Performed by: OTOLARYNGOLOGY

## 2021-04-01 PROCEDURE — 99999 PR PBB SHADOW E&M-EST. PATIENT-LVL IV: ICD-10-PCS | Mod: PBBFAC,,, | Performed by: OTOLARYNGOLOGY

## 2021-04-01 PROCEDURE — 31575 DIAGNOSTIC LARYNGOSCOPY: CPT | Mod: S$GLB,,, | Performed by: OTOLARYNGOLOGY

## 2021-04-01 RX ORDER — LIDOCAINE HYDROCHLORIDE 10 MG/ML
1 INJECTION, SOLUTION EPIDURAL; INFILTRATION; INTRACAUDAL; PERINEURAL ONCE
Status: CANCELLED | OUTPATIENT
Start: 2021-04-01 | End: 2021-04-01

## 2021-04-01 RX ORDER — DEXAMETHASONE SODIUM PHOSPHATE 4 MG/ML
8 INJECTION, SOLUTION INTRA-ARTICULAR; INTRALESIONAL; INTRAMUSCULAR; INTRAVENOUS; SOFT TISSUE
Status: CANCELLED | OUTPATIENT
Start: 2021-04-01

## 2021-04-01 RX ORDER — AZITHROMYCIN 250 MG/1
250 TABLET, FILM COATED ORAL DAILY
Qty: 6 TABLET | Refills: 0 | Status: SHIPPED | OUTPATIENT
Start: 2021-04-01 | End: 2021-04-06

## 2021-04-01 RX ORDER — LEVOCETIRIZINE DIHYDROCHLORIDE 5 MG/1
5 TABLET, FILM COATED ORAL NIGHTLY
COMMUNITY

## 2021-04-01 RX ORDER — METHYLPREDNISOLONE 4 MG/1
TABLET ORAL
Qty: 1 PACKAGE | Refills: 0 | Status: SHIPPED | OUTPATIENT
Start: 2021-04-01

## 2021-04-01 RX ORDER — MONTELUKAST SODIUM 10 MG/1
10 TABLET ORAL NIGHTLY
Qty: 90 TABLET | Refills: 0 | Status: SHIPPED | OUTPATIENT
Start: 2021-04-01 | End: 2021-04-21 | Stop reason: SDUPTHER

## 2021-04-03 ENCOUNTER — LAB VISIT (OUTPATIENT)
Dept: FAMILY MEDICINE | Facility: CLINIC | Age: 51
End: 2021-04-03
Payer: COMMERCIAL

## 2021-04-03 DIAGNOSIS — Z03.818 ENCOUNTER FOR OBSERVATION FOR SUSPECTED EXPOSURE TO OTHER BIOLOGICAL AGENTS RULED OUT: ICD-10-CM

## 2021-04-03 PROCEDURE — U0005 INFEC AGEN DETEC AMPLI PROBE: HCPCS | Performed by: OTOLARYNGOLOGY

## 2021-04-03 PROCEDURE — U0003 INFECTIOUS AGENT DETECTION BY NUCLEIC ACID (DNA OR RNA); SEVERE ACUTE RESPIRATORY SYNDROME CORONAVIRUS 2 (SARS-COV-2) (CORONAVIRUS DISEASE [COVID-19]), AMPLIFIED PROBE TECHNIQUE, MAKING USE OF HIGH THROUGHPUT TECHNOLOGIES AS DESCRIBED BY CMS-2020-01-R: HCPCS | Performed by: OTOLARYNGOLOGY

## 2021-04-04 LAB — SARS-COV-2 RNA RESP QL NAA+PROBE: NOT DETECTED

## 2021-04-05 ENCOUNTER — TELEPHONE (OUTPATIENT)
Dept: OTOLARYNGOLOGY | Facility: CLINIC | Age: 51
End: 2021-04-05

## 2021-04-05 ENCOUNTER — TELEPHONE (OUTPATIENT)
Dept: RADIOLOGY | Facility: HOSPITAL | Age: 51
End: 2021-04-05

## 2021-04-06 ENCOUNTER — PATIENT MESSAGE (OUTPATIENT)
Dept: ADMINISTRATIVE | Facility: HOSPITAL | Age: 51
End: 2021-04-06

## 2021-04-06 ENCOUNTER — HOSPITAL ENCOUNTER (OUTPATIENT)
Facility: HOSPITAL | Age: 51
Discharge: HOME | End: 2021-04-06
Attending: OTOLARYNGOLOGY | Admitting: OTOLARYNGOLOGY
Payer: COMMERCIAL

## 2021-04-06 ENCOUNTER — ANESTHESIA (OUTPATIENT)
Dept: SURGERY | Facility: HOSPITAL | Age: 51
End: 2021-04-06
Payer: COMMERCIAL

## 2021-04-06 ENCOUNTER — ANESTHESIA EVENT (OUTPATIENT)
Dept: SURGERY | Facility: HOSPITAL | Age: 51
End: 2021-04-06
Payer: COMMERCIAL

## 2021-04-06 VITALS
WEIGHT: 190 LBS | HEART RATE: 65 BPM | BODY MASS INDEX: 34.75 KG/M2 | RESPIRATION RATE: 35 BRPM | OXYGEN SATURATION: 99 % | TEMPERATURE: 97 F | DIASTOLIC BLOOD PRESSURE: 77 MMHG | SYSTOLIC BLOOD PRESSURE: 163 MMHG

## 2021-04-06 DIAGNOSIS — J39.8 TRACHEAL STENOSIS: ICD-10-CM

## 2021-04-06 DIAGNOSIS — J38.6 SUBGLOTTIC STENOSIS: Primary | ICD-10-CM

## 2021-04-06 PROCEDURE — 36000709 HC OR TIME LEV III EA ADD 15 MIN: Performed by: OTOLARYNGOLOGY

## 2021-04-06 PROCEDURE — 31529 PR LARYNGOSCOPY DIRECT,W/DILAT, SUBSEQUENT: ICD-10-PCS | Mod: 51,,, | Performed by: OTOLARYNGOLOGY

## 2021-04-06 PROCEDURE — D9220A PRA ANESTHESIA: Mod: ,,, | Performed by: STUDENT IN AN ORGANIZED HEALTH CARE EDUCATION/TRAINING PROGRAM

## 2021-04-06 PROCEDURE — 31571 LARYNGOSCOP W/VC INJ + SCOPE: CPT | Mod: ,,, | Performed by: OTOLARYNGOLOGY

## 2021-04-06 PROCEDURE — 31571 PR LARYNGOSCOPY,DIRECT,SCOPE,INJ CORDS: ICD-10-PCS | Mod: ,,, | Performed by: OTOLARYNGOLOGY

## 2021-04-06 PROCEDURE — 37000009 HC ANESTHESIA EA ADD 15 MINS: Performed by: OTOLARYNGOLOGY

## 2021-04-06 PROCEDURE — C1726 CATH, BAL DIL, NON-VASCULAR: HCPCS | Performed by: OTOLARYNGOLOGY

## 2021-04-06 PROCEDURE — 25000003 PHARM REV CODE 250: Performed by: STUDENT IN AN ORGANIZED HEALTH CARE EDUCATION/TRAINING PROGRAM

## 2021-04-06 PROCEDURE — 71000015 HC POSTOP RECOV 1ST HR: Performed by: OTOLARYNGOLOGY

## 2021-04-06 PROCEDURE — 37000008 HC ANESTHESIA 1ST 15 MINUTES: Performed by: OTOLARYNGOLOGY

## 2021-04-06 PROCEDURE — 63600175 PHARM REV CODE 636 W HCPCS: Performed by: OTOLARYNGOLOGY

## 2021-04-06 PROCEDURE — 63600175 PHARM REV CODE 636 W HCPCS: Performed by: STUDENT IN AN ORGANIZED HEALTH CARE EDUCATION/TRAINING PROGRAM

## 2021-04-06 PROCEDURE — D9220A PRA ANESTHESIA: ICD-10-PCS | Mod: ,,, | Performed by: STUDENT IN AN ORGANIZED HEALTH CARE EDUCATION/TRAINING PROGRAM

## 2021-04-06 PROCEDURE — 36000708 HC OR TIME LEV III 1ST 15 MIN: Performed by: OTOLARYNGOLOGY

## 2021-04-06 PROCEDURE — 25000003 PHARM REV CODE 250: Performed by: OTOLARYNGOLOGY

## 2021-04-06 PROCEDURE — 31529 LARYNGOSCOPY AND DILATION: CPT | Mod: 51,,, | Performed by: OTOLARYNGOLOGY

## 2021-04-06 PROCEDURE — 71000044 HC DOSC ROUTINE RECOVERY FIRST HOUR: Performed by: OTOLARYNGOLOGY

## 2021-04-06 PROCEDURE — 27201423 OPTIME MED/SURG SUP & DEVICES STERILE SUPPLY: Performed by: OTOLARYNGOLOGY

## 2021-04-06 RX ORDER — ONDANSETRON 2 MG/ML
INJECTION INTRAMUSCULAR; INTRAVENOUS
Status: DISCONTINUED | OUTPATIENT
Start: 2021-04-06 | End: 2021-04-06

## 2021-04-06 RX ORDER — ONDANSETRON 2 MG/ML
4 INJECTION INTRAMUSCULAR; INTRAVENOUS ONCE AS NEEDED
Status: DISCONTINUED | OUTPATIENT
Start: 2021-04-06 | End: 2021-04-06 | Stop reason: HOSPADM

## 2021-04-06 RX ORDER — FENTANYL CITRATE 50 UG/ML
25 INJECTION, SOLUTION INTRAMUSCULAR; INTRAVENOUS EVERY 5 MIN PRN
Status: DISCONTINUED | OUTPATIENT
Start: 2021-04-06 | End: 2021-04-06 | Stop reason: HOSPADM

## 2021-04-06 RX ORDER — PROPOFOL 10 MG/ML
VIAL (ML) INTRAVENOUS
Status: DISCONTINUED | OUTPATIENT
Start: 2021-04-06 | End: 2021-04-06

## 2021-04-06 RX ORDER — DEXAMETHASONE SODIUM PHOSPHATE 4 MG/ML
INJECTION, SOLUTION INTRA-ARTICULAR; INTRALESIONAL; INTRAMUSCULAR; INTRAVENOUS; SOFT TISSUE
Status: DISCONTINUED | OUTPATIENT
Start: 2021-04-06 | End: 2021-04-06

## 2021-04-06 RX ORDER — LIDOCAINE HYDROCHLORIDE 40 MG/ML
INJECTION, SOLUTION RETROBULBAR
Status: DISCONTINUED | OUTPATIENT
Start: 2021-04-06 | End: 2021-04-06 | Stop reason: HOSPADM

## 2021-04-06 RX ORDER — LIDOCAINE HYDROCHLORIDE 20 MG/ML
INJECTION INTRAVENOUS
Status: DISCONTINUED | OUTPATIENT
Start: 2021-04-06 | End: 2021-04-06

## 2021-04-06 RX ORDER — AMOXICILLIN AND CLAVULANATE POTASSIUM 875; 125 MG/1; MG/1
1 TABLET, FILM COATED ORAL EVERY 12 HOURS
Qty: 28 TABLET | Refills: 0 | Status: SHIPPED | OUTPATIENT
Start: 2021-04-06 | End: 2021-04-20

## 2021-04-06 RX ORDER — HYDROCODONE BITARTRATE AND ACETAMINOPHEN 5; 325 MG/1; MG/1
1 TABLET ORAL EVERY 6 HOURS PRN
Qty: 20 TABLET | Refills: 0 | Status: SHIPPED | OUTPATIENT
Start: 2021-04-06

## 2021-04-06 RX ORDER — EPINEPHRINE 1 MG/ML
INJECTION, SOLUTION INTRACARDIAC; INTRAMUSCULAR; INTRAVENOUS; SUBCUTANEOUS
Status: DISCONTINUED | OUTPATIENT
Start: 2021-04-06 | End: 2021-04-06 | Stop reason: HOSPADM

## 2021-04-06 RX ORDER — FENTANYL CITRATE 50 UG/ML
INJECTION, SOLUTION INTRAMUSCULAR; INTRAVENOUS
Status: DISCONTINUED | OUTPATIENT
Start: 2021-04-06 | End: 2021-04-06

## 2021-04-06 RX ORDER — ONDANSETRON 4 MG/1
4 TABLET, ORALLY DISINTEGRATING ORAL EVERY 6 HOURS PRN
Qty: 20 TABLET | Refills: 0 | Status: SHIPPED | OUTPATIENT
Start: 2021-04-06

## 2021-04-06 RX ORDER — LIDOCAINE HYDROCHLORIDE 10 MG/ML
1 INJECTION, SOLUTION EPIDURAL; INFILTRATION; INTRACAUDAL; PERINEURAL ONCE
Status: DISCONTINUED | OUTPATIENT
Start: 2021-04-06 | End: 2021-04-06 | Stop reason: HOSPADM

## 2021-04-06 RX ORDER — ROCURONIUM BROMIDE 10 MG/ML
INJECTION, SOLUTION INTRAVENOUS
Status: DISCONTINUED | OUTPATIENT
Start: 2021-04-06 | End: 2021-04-06

## 2021-04-06 RX ORDER — MIDAZOLAM HYDROCHLORIDE 1 MG/ML
INJECTION, SOLUTION INTRAMUSCULAR; INTRAVENOUS
Status: DISCONTINUED | OUTPATIENT
Start: 2021-04-06 | End: 2021-04-06

## 2021-04-06 RX ORDER — DEXAMETHASONE SODIUM PHOSPHATE 4 MG/ML
8 INJECTION, SOLUTION INTRA-ARTICULAR; INTRALESIONAL; INTRAMUSCULAR; INTRAVENOUS; SOFT TISSUE
Status: DISCONTINUED | OUTPATIENT
Start: 2021-04-06 | End: 2021-04-06 | Stop reason: HOSPADM

## 2021-04-06 RX ORDER — SODIUM CHLORIDE 0.9 % (FLUSH) 0.9 %
10 SYRINGE (ML) INJECTION
Status: DISCONTINUED | OUTPATIENT
Start: 2021-04-06 | End: 2021-04-06 | Stop reason: HOSPADM

## 2021-04-06 RX ADMIN — AMPICILLIN SODIUM AND SULBACTAM SODIUM 3 G: 2; 1 INJECTION, POWDER, FOR SOLUTION INTRAMUSCULAR; INTRAVENOUS at 12:04

## 2021-04-06 RX ADMIN — ROCURONIUM BROMIDE 25 MG: 10 INJECTION, SOLUTION INTRAVENOUS at 12:04

## 2021-04-06 RX ADMIN — ONDANSETRON 4 MG: 2 INJECTION, SOLUTION INTRAMUSCULAR; INTRAVENOUS at 12:04

## 2021-04-06 RX ADMIN — PROPOFOL 200 MCG/KG/MIN: 10 INJECTION, EMULSION INTRAVENOUS at 12:04

## 2021-04-06 RX ADMIN — LIDOCAINE HYDROCHLORIDE 80 MG: 20 INJECTION, SOLUTION INTRAVENOUS at 12:04

## 2021-04-06 RX ADMIN — PROPOFOL 80 MG: 10 INJECTION, EMULSION INTRAVENOUS at 12:04

## 2021-04-06 RX ADMIN — FENTANYL CITRATE 50 MCG: 50 INJECTION, SOLUTION INTRAMUSCULAR; INTRAVENOUS at 12:04

## 2021-04-06 RX ADMIN — PROPOFOL 50 MG: 10 INJECTION, EMULSION INTRAVENOUS at 12:04

## 2021-04-06 RX ADMIN — SODIUM CHLORIDE: 9 INJECTION, SOLUTION INTRAVENOUS at 12:04

## 2021-04-06 RX ADMIN — FENTANYL CITRATE 25 MCG: 50 INJECTION, SOLUTION INTRAMUSCULAR; INTRAVENOUS at 12:04

## 2021-04-06 RX ADMIN — SUGAMMADEX 400 MG: 100 INJECTION, SOLUTION INTRAVENOUS at 01:04

## 2021-04-06 RX ADMIN — DEXAMETHASONE SODIUM PHOSPHATE 8 MG: 4 INJECTION, SOLUTION INTRAMUSCULAR; INTRAVENOUS at 12:04

## 2021-04-06 RX ADMIN — ROCURONIUM BROMIDE 50 MG: 10 INJECTION, SOLUTION INTRAVENOUS at 12:04

## 2021-04-06 RX ADMIN — MIDAZOLAM HYDROCHLORIDE 2 MG: 1 INJECTION, SOLUTION INTRAMUSCULAR; INTRAVENOUS at 12:04

## 2021-04-09 ENCOUNTER — HOSPITAL ENCOUNTER (OUTPATIENT)
Dept: RADIOLOGY | Facility: HOSPITAL | Age: 51
Discharge: HOME | End: 2021-04-09
Attending: FAMILY MEDICINE
Payer: COMMERCIAL

## 2021-04-09 ENCOUNTER — OFFICE VISIT (OUTPATIENT)
Dept: FAMILY MEDICINE | Facility: CLINIC | Age: 51
End: 2021-04-09
Payer: COMMERCIAL

## 2021-04-09 VITALS
HEIGHT: 62 IN | TEMPERATURE: 98 F | BODY MASS INDEX: 36.07 KG/M2 | SYSTOLIC BLOOD PRESSURE: 154 MMHG | DIASTOLIC BLOOD PRESSURE: 80 MMHG | OXYGEN SATURATION: 97 % | HEART RATE: 65 BPM | WEIGHT: 196 LBS

## 2021-04-09 DIAGNOSIS — R92.8 ABNORMAL MAMMOGRAM OF RIGHT BREAST: ICD-10-CM

## 2021-04-09 DIAGNOSIS — K21.9 GASTROESOPHAGEAL REFLUX DISEASE WITHOUT ESOPHAGITIS: ICD-10-CM

## 2021-04-09 DIAGNOSIS — Z01.419 WELL WOMAN EXAM WITH ROUTINE GYNECOLOGICAL EXAM: Primary | ICD-10-CM

## 2021-04-09 PROCEDURE — 3008F BODY MASS INDEX DOCD: CPT | Mod: CPTII,S$GLB,, | Performed by: FAMILY MEDICINE

## 2021-04-09 PROCEDURE — 77061 BREAST TOMOSYNTHESIS UNI: CPT | Mod: 26,RT,, | Performed by: RADIOLOGY

## 2021-04-09 PROCEDURE — 88175 CYTOPATH C/V AUTO FLUID REDO: CPT | Performed by: FAMILY MEDICINE

## 2021-04-09 PROCEDURE — 76642 ULTRASOUND BREAST LIMITED: CPT | Mod: TC,PO,RT

## 2021-04-09 PROCEDURE — 87624 HPV HI-RISK TYP POOLED RSLT: CPT | Performed by: FAMILY MEDICINE

## 2021-04-09 PROCEDURE — 77061 MAMMO DIGITAL DIAGNOSTIC RIGHT WITH TOMO: ICD-10-PCS | Mod: 26,RT,, | Performed by: RADIOLOGY

## 2021-04-09 PROCEDURE — 99999 PR PBB SHADOW E&M-EST. PATIENT-LVL IV: ICD-10-PCS | Mod: PBBFAC,,, | Performed by: FAMILY MEDICINE

## 2021-04-09 PROCEDURE — 77065 MAMMO DIGITAL DIAGNOSTIC RIGHT WITH TOMO: ICD-10-PCS | Mod: 26,RT,, | Performed by: RADIOLOGY

## 2021-04-09 PROCEDURE — 1126F AMNT PAIN NOTED NONE PRSNT: CPT | Mod: S$GLB,,, | Performed by: FAMILY MEDICINE

## 2021-04-09 PROCEDURE — 99396 PR PREVENTIVE VISIT,EST,40-64: ICD-10-PCS | Mod: S$GLB,,, | Performed by: FAMILY MEDICINE

## 2021-04-09 PROCEDURE — 99396 PREV VISIT EST AGE 40-64: CPT | Mod: S$GLB,,, | Performed by: FAMILY MEDICINE

## 2021-04-09 PROCEDURE — 76642 US BREAST RIGHT LIMITED: ICD-10-PCS | Mod: 26,RT,, | Performed by: RADIOLOGY

## 2021-04-09 PROCEDURE — 3008F PR BODY MASS INDEX (BMI) DOCUMENTED: ICD-10-PCS | Mod: CPTII,S$GLB,, | Performed by: FAMILY MEDICINE

## 2021-04-09 PROCEDURE — 76642 ULTRASOUND BREAST LIMITED: CPT | Mod: 26,RT,, | Performed by: RADIOLOGY

## 2021-04-09 PROCEDURE — 1126F PR PAIN SEVERITY QUANTIFIED, NO PAIN PRESENT: ICD-10-PCS | Mod: S$GLB,,, | Performed by: FAMILY MEDICINE

## 2021-04-09 PROCEDURE — 77061 BREAST TOMOSYNTHESIS UNI: CPT | Mod: TC,PO,RT

## 2021-04-09 PROCEDURE — 77065 DX MAMMO INCL CAD UNI: CPT | Mod: 26,RT,, | Performed by: RADIOLOGY

## 2021-04-09 PROCEDURE — 99999 PR PBB SHADOW E&M-EST. PATIENT-LVL IV: CPT | Mod: PBBFAC,,, | Performed by: FAMILY MEDICINE

## 2021-04-09 RX ORDER — OMEPRAZOLE 40 MG/1
40 CAPSULE, DELAYED RELEASE ORAL
Qty: 180 CAPSULE | Refills: 3 | Status: SHIPPED | OUTPATIENT
Start: 2021-04-09 | End: 2022-03-18 | Stop reason: SDUPTHER

## 2021-04-15 LAB
FINAL PATHOLOGIC DIAGNOSIS: NORMAL
HPV HR 12 DNA SPEC QL NAA+PROBE: NEGATIVE
HPV16 AG SPEC QL: NEGATIVE
HPV18 DNA SPEC QL NAA+PROBE: NEGATIVE
Lab: NORMAL

## 2021-04-20 DIAGNOSIS — I10 ESSENTIAL HYPERTENSION: ICD-10-CM

## 2021-04-20 DIAGNOSIS — F41.9 ANXIETY: ICD-10-CM

## 2021-04-21 ENCOUNTER — OFFICE VISIT (OUTPATIENT)
Dept: OTOLARYNGOLOGY | Facility: CLINIC | Age: 51
End: 2021-04-21
Payer: COMMERCIAL

## 2021-04-21 VITALS — HEART RATE: 65 BPM | DIASTOLIC BLOOD PRESSURE: 77 MMHG | SYSTOLIC BLOOD PRESSURE: 165 MMHG

## 2021-04-21 DIAGNOSIS — J39.8 TRACHEAL STENOSIS: Primary | ICD-10-CM

## 2021-04-21 DIAGNOSIS — J38.6 SUBGLOTTIC STENOSIS: ICD-10-CM

## 2021-04-21 PROCEDURE — 1126F PR PAIN SEVERITY QUANTIFIED, NO PAIN PRESENT: ICD-10-PCS | Mod: S$GLB,,, | Performed by: OTOLARYNGOLOGY

## 2021-04-21 PROCEDURE — 99999 PR PBB SHADOW E&M-EST. PATIENT-LVL III: ICD-10-PCS | Mod: PBBFAC,,, | Performed by: OTOLARYNGOLOGY

## 2021-04-21 PROCEDURE — 1126F AMNT PAIN NOTED NONE PRSNT: CPT | Mod: S$GLB,,, | Performed by: OTOLARYNGOLOGY

## 2021-04-21 PROCEDURE — 99213 PR OFFICE/OUTPT VISIT, EST, LEVL III, 20-29 MIN: ICD-10-PCS | Mod: 25,S$GLB,, | Performed by: OTOLARYNGOLOGY

## 2021-04-21 PROCEDURE — 31575 DIAGNOSTIC LARYNGOSCOPY: CPT | Mod: S$GLB,,, | Performed by: OTOLARYNGOLOGY

## 2021-04-21 PROCEDURE — 99213 OFFICE O/P EST LOW 20 MIN: CPT | Mod: 25,S$GLB,, | Performed by: OTOLARYNGOLOGY

## 2021-04-21 PROCEDURE — 31575 PR LARYNGOSCOPY, FLEXIBLE; DIAGNOSTIC: ICD-10-PCS | Mod: S$GLB,,, | Performed by: OTOLARYNGOLOGY

## 2021-04-21 PROCEDURE — 99999 PR PBB SHADOW E&M-EST. PATIENT-LVL III: CPT | Mod: PBBFAC,,, | Performed by: OTOLARYNGOLOGY

## 2021-04-21 RX ORDER — SODIUM CHLORIDE FOR INHALATION 3 %
4 VIAL, NEBULIZER (ML) INHALATION 3 TIMES DAILY
Qty: 360 ML | Refills: 2 | Status: SHIPPED | OUTPATIENT
Start: 2021-04-21 | End: 2021-07-20

## 2021-04-21 RX ORDER — MONTELUKAST SODIUM 10 MG/1
10 TABLET ORAL NIGHTLY
Qty: 90 TABLET | Refills: 0 | Status: SHIPPED | OUTPATIENT
Start: 2021-04-21 | End: 2021-06-29

## 2021-04-23 RX ORDER — BISOPROLOL FUMARATE AND HYDROCHLOROTHIAZIDE 10; 6.25 MG/1; MG/1
TABLET ORAL
Qty: 90 TABLET | Refills: 1 | Status: SHIPPED | OUTPATIENT
Start: 2021-04-23 | End: 2021-06-03 | Stop reason: SDUPTHER

## 2021-04-23 RX ORDER — LOSARTAN POTASSIUM 25 MG/1
TABLET ORAL
Qty: 90 TABLET | Refills: 1 | Status: SHIPPED | OUTPATIENT
Start: 2021-04-23 | End: 2021-06-03 | Stop reason: SDUPTHER

## 2021-04-23 RX ORDER — ESCITALOPRAM OXALATE 10 MG/1
TABLET ORAL
Qty: 90 TABLET | Refills: 3 | Status: SHIPPED | OUTPATIENT
Start: 2021-04-23 | End: 2022-04-18

## 2021-05-13 ENCOUNTER — PROCEDURE VISIT (OUTPATIENT)
Dept: OTOLARYNGOLOGY | Facility: CLINIC | Age: 51
End: 2021-05-13
Payer: COMMERCIAL

## 2021-05-13 VITALS — SYSTOLIC BLOOD PRESSURE: 159 MMHG | DIASTOLIC BLOOD PRESSURE: 76 MMHG | HEART RATE: 71 BPM

## 2021-05-13 DIAGNOSIS — J38.6 SUBGLOTTIC STENOSIS: ICD-10-CM

## 2021-05-13 DIAGNOSIS — J39.8 TRACHEAL STENOSIS: Primary | ICD-10-CM

## 2021-05-13 PROCEDURE — 31573 PR LARYNGOSCOPY, FLEXIBLE; W/ THER INJ, UNI: ICD-10-PCS | Mod: S$GLB,,, | Performed by: OTOLARYNGOLOGY

## 2021-05-13 PROCEDURE — 31573 LARGSC W/THER INJECTION: CPT | Mod: S$GLB,,, | Performed by: OTOLARYNGOLOGY

## 2021-05-13 RX ADMIN — TRIAMCINOLONE ACETONIDE 20 MG: 40 INJECTION, SUSPENSION INTRA-ARTICULAR; INTRAMUSCULAR at 03:05

## 2021-05-14 DIAGNOSIS — J38.6 SUBGLOTTIC STENOSIS: ICD-10-CM

## 2021-05-14 DIAGNOSIS — J39.8 TRACHEAL STENOSIS: Primary | ICD-10-CM

## 2021-05-20 RX ORDER — TRIAMCINOLONE ACETONIDE 40 MG/ML
20 INJECTION, SUSPENSION INTRA-ARTICULAR; INTRAMUSCULAR
Status: COMPLETED | OUTPATIENT
Start: 2021-05-13 | End: 2021-05-13

## 2021-06-02 ENCOUNTER — PATIENT MESSAGE (OUTPATIENT)
Dept: FAMILY MEDICINE | Facility: CLINIC | Age: 51
End: 2021-06-02

## 2021-06-02 DIAGNOSIS — I10 ESSENTIAL HYPERTENSION: ICD-10-CM

## 2021-06-02 DIAGNOSIS — F51.01 PRIMARY INSOMNIA: ICD-10-CM

## 2021-06-04 RX ORDER — BISOPROLOL FUMARATE AND HYDROCHLOROTHIAZIDE 10; 6.25 MG/1; MG/1
1 TABLET ORAL DAILY
Qty: 90 TABLET | Refills: 1 | Status: SHIPPED | OUTPATIENT
Start: 2021-06-04 | End: 2022-01-03 | Stop reason: SDUPTHER

## 2021-06-04 RX ORDER — LOSARTAN POTASSIUM 25 MG/1
25 TABLET ORAL DAILY
Qty: 90 TABLET | Refills: 1 | Status: SHIPPED | OUTPATIENT
Start: 2021-06-04 | End: 2022-01-03 | Stop reason: SDUPTHER

## 2021-06-04 RX ORDER — ZOLPIDEM TARTRATE 10 MG/1
10 TABLET ORAL NIGHTLY PRN
Qty: 90 TABLET | Refills: 0 | Status: SHIPPED | OUTPATIENT
Start: 2021-06-04 | End: 2021-09-06 | Stop reason: SDUPTHER

## 2021-06-23 ENCOUNTER — PATIENT MESSAGE (OUTPATIENT)
Dept: OTOLARYNGOLOGY | Facility: CLINIC | Age: 51
End: 2021-06-23

## 2021-06-29 ENCOUNTER — LAB VISIT (OUTPATIENT)
Dept: LAB | Facility: HOSPITAL | Age: 51
End: 2021-06-29
Attending: FAMILY MEDICINE
Payer: COMMERCIAL

## 2021-06-29 ENCOUNTER — OFFICE VISIT (OUTPATIENT)
Dept: FAMILY MEDICINE | Facility: CLINIC | Age: 51
End: 2021-06-29
Payer: COMMERCIAL

## 2021-06-29 VITALS
WEIGHT: 198.88 LBS | TEMPERATURE: 98 F | SYSTOLIC BLOOD PRESSURE: 122 MMHG | HEART RATE: 66 BPM | BODY MASS INDEX: 36.6 KG/M2 | OXYGEN SATURATION: 97 % | DIASTOLIC BLOOD PRESSURE: 80 MMHG | HEIGHT: 62 IN

## 2021-06-29 DIAGNOSIS — Z00.00 PREVENTATIVE HEALTH CARE: ICD-10-CM

## 2021-06-29 DIAGNOSIS — Z00.00 PREVENTATIVE HEALTH CARE: Primary | ICD-10-CM

## 2021-06-29 LAB
BASOPHILS # BLD AUTO: 0.03 K/UL (ref 0–0.2)
BASOPHILS NFR BLD: 0.4 % (ref 0–1.9)
DIFFERENTIAL METHOD: ABNORMAL
EOSINOPHIL # BLD AUTO: 0.2 K/UL (ref 0–0.5)
EOSINOPHIL NFR BLD: 2.3 % (ref 0–8)
ERYTHROCYTE [DISTWIDTH] IN BLOOD BY AUTOMATED COUNT: 12.7 % (ref 11.5–14.5)
ESTIMATED AVG GLUCOSE: 111 MG/DL (ref 68–131)
HBA1C MFR BLD: 5.5 % (ref 4–5.6)
HCT VFR BLD AUTO: 41.2 % (ref 37–48.5)
HGB BLD-MCNC: 12.9 G/DL (ref 12–16)
IMM GRANULOCYTES # BLD AUTO: 0.01 K/UL (ref 0–0.04)
IMM GRANULOCYTES NFR BLD AUTO: 0.1 % (ref 0–0.5)
LYMPHOCYTES # BLD AUTO: 1.9 K/UL (ref 1–4.8)
LYMPHOCYTES NFR BLD: 27.2 % (ref 18–48)
MCH RBC QN AUTO: 28.4 PG (ref 27–31)
MCHC RBC AUTO-ENTMCNC: 31.3 G/DL (ref 32–36)
MCV RBC AUTO: 91 FL (ref 82–98)
MONOCYTES # BLD AUTO: 0.6 K/UL (ref 0.3–1)
MONOCYTES NFR BLD: 8.7 % (ref 4–15)
NEUTROPHILS # BLD AUTO: 4.4 K/UL (ref 1.8–7.7)
NEUTROPHILS NFR BLD: 61.3 % (ref 38–73)
NRBC BLD-RTO: 0 /100 WBC
PLATELET # BLD AUTO: 242 K/UL (ref 150–450)
PMV BLD AUTO: 11.9 FL (ref 9.2–12.9)
RBC # BLD AUTO: 4.54 M/UL (ref 4–5.4)
WBC # BLD AUTO: 7.1 K/UL (ref 3.9–12.7)

## 2021-06-29 PROCEDURE — 83036 HEMOGLOBIN GLYCOSYLATED A1C: CPT | Performed by: FAMILY MEDICINE

## 2021-06-29 PROCEDURE — 99396 PREV VISIT EST AGE 40-64: CPT | Mod: S$GLB,,, | Performed by: FAMILY MEDICINE

## 2021-06-29 PROCEDURE — 1126F AMNT PAIN NOTED NONE PRSNT: CPT | Mod: S$GLB,,, | Performed by: FAMILY MEDICINE

## 2021-06-29 PROCEDURE — 36415 COLL VENOUS BLD VENIPUNCTURE: CPT | Mod: PO | Performed by: FAMILY MEDICINE

## 2021-06-29 PROCEDURE — 3008F BODY MASS INDEX DOCD: CPT | Mod: CPTII,S$GLB,, | Performed by: FAMILY MEDICINE

## 2021-06-29 PROCEDURE — 84443 ASSAY THYROID STIM HORMONE: CPT | Performed by: FAMILY MEDICINE

## 2021-06-29 PROCEDURE — 99999 PR PBB SHADOW E&M-EST. PATIENT-LVL IV: CPT | Mod: PBBFAC,,, | Performed by: FAMILY MEDICINE

## 2021-06-29 PROCEDURE — 99999 PR PBB SHADOW E&M-EST. PATIENT-LVL IV: ICD-10-PCS | Mod: PBBFAC,,, | Performed by: FAMILY MEDICINE

## 2021-06-29 PROCEDURE — 1126F PR PAIN SEVERITY QUANTIFIED, NO PAIN PRESENT: ICD-10-PCS | Mod: S$GLB,,, | Performed by: FAMILY MEDICINE

## 2021-06-29 PROCEDURE — 85025 COMPLETE CBC W/AUTO DIFF WBC: CPT | Performed by: FAMILY MEDICINE

## 2021-06-29 PROCEDURE — 80061 LIPID PANEL: CPT | Performed by: FAMILY MEDICINE

## 2021-06-29 PROCEDURE — 3008F PR BODY MASS INDEX (BMI) DOCUMENTED: ICD-10-PCS | Mod: CPTII,S$GLB,, | Performed by: FAMILY MEDICINE

## 2021-06-29 PROCEDURE — 80053 COMPREHEN METABOLIC PANEL: CPT | Performed by: FAMILY MEDICINE

## 2021-06-29 PROCEDURE — 99396 PR PREVENTIVE VISIT,EST,40-64: ICD-10-PCS | Mod: S$GLB,,, | Performed by: FAMILY MEDICINE

## 2021-06-29 RX ORDER — MONTELUKAST SODIUM 10 MG/1
10 TABLET ORAL NIGHTLY
Qty: 90 TABLET | Refills: 3 | Status: SHIPPED | OUTPATIENT
Start: 2021-06-29 | End: 2022-06-22

## 2021-06-30 LAB
ALBUMIN SERPL BCP-MCNC: 3.9 G/DL (ref 3.5–5.2)
ALP SERPL-CCNC: 82 U/L (ref 55–135)
ALT SERPL W/O P-5'-P-CCNC: 25 U/L (ref 10–44)
ANION GAP SERPL CALC-SCNC: 10 MMOL/L (ref 8–16)
AST SERPL-CCNC: 22 U/L (ref 10–40)
BILIRUB SERPL-MCNC: 0.5 MG/DL (ref 0.1–1)
BUN SERPL-MCNC: 13 MG/DL (ref 6–20)
CALCIUM SERPL-MCNC: 10.4 MG/DL (ref 8.7–10.5)
CHLORIDE SERPL-SCNC: 104 MMOL/L (ref 95–110)
CHOLEST SERPL-MCNC: 189 MG/DL (ref 120–199)
CHOLEST/HDLC SERPL: 2.8 {RATIO} (ref 2–5)
CO2 SERPL-SCNC: 25 MMOL/L (ref 23–29)
CREAT SERPL-MCNC: 0.8 MG/DL (ref 0.5–1.4)
EST. GFR  (AFRICAN AMERICAN): >60 ML/MIN/1.73 M^2
EST. GFR  (NON AFRICAN AMERICAN): >60 ML/MIN/1.73 M^2
GLUCOSE SERPL-MCNC: 99 MG/DL (ref 70–110)
HDLC SERPL-MCNC: 67 MG/DL (ref 40–75)
HDLC SERPL: 35.4 % (ref 20–50)
LDLC SERPL CALC-MCNC: 104.6 MG/DL (ref 63–159)
NONHDLC SERPL-MCNC: 122 MG/DL
POTASSIUM SERPL-SCNC: 4 MMOL/L (ref 3.5–5.1)
PROT SERPL-MCNC: 7.3 G/DL (ref 6–8.4)
SODIUM SERPL-SCNC: 139 MMOL/L (ref 136–145)
TRIGL SERPL-MCNC: 87 MG/DL (ref 30–150)
TSH SERPL DL<=0.005 MIU/L-ACNC: 1.16 UIU/ML (ref 0.4–4)

## 2021-09-06 DIAGNOSIS — F51.01 PRIMARY INSOMNIA: ICD-10-CM

## 2021-09-07 RX ORDER — ZOLPIDEM TARTRATE 10 MG/1
10 TABLET ORAL NIGHTLY PRN
Qty: 90 TABLET | Refills: 0 | Status: SHIPPED | OUTPATIENT
Start: 2021-09-07 | End: 2021-11-30

## 2022-03-16 DIAGNOSIS — K21.9 GASTROESOPHAGEAL REFLUX DISEASE WITHOUT ESOPHAGITIS: ICD-10-CM

## 2022-03-17 NOTE — TELEPHONE ENCOUNTER
No new care gaps identified.  Powered by [employer]. Reference number: [id number].   3/16/2022 11:54:40 PM CDT

## 2022-03-18 RX ORDER — OMEPRAZOLE 40 MG/1
CAPSULE, DELAYED RELEASE ORAL
Qty: 180 CAPSULE | Refills: 3 | Status: SHIPPED | OUTPATIENT
Start: 2022-03-18

## 2022-03-18 NOTE — TELEPHONE ENCOUNTER
This Rx Request does not qualify for assessment with the [facility]   Please review protocol details and the Care Due Message for extra clinical information    Reasons Rx Request may be deferred:  [facility] max daily dose 40mg    Note composed:5:32 PM 03/18/2022

## 2022-04-18 DIAGNOSIS — F41.9 ANXIETY: ICD-10-CM

## 2022-04-18 RX ORDER — ESCITALOPRAM OXALATE 10 MG/1
TABLET ORAL
Qty: 90 TABLET | Refills: 0 | Status: SHIPPED | OUTPATIENT
Start: 2022-04-18 | End: 2022-07-19

## 2022-04-18 NOTE — TELEPHONE ENCOUNTER
Refill Authorization Note   Denise Fry  is requesting a refill authorization.  Brief Assessment and Rationale for Refill:  Approve    -Medication-Related Problems Identified:   Requires labs  Requires appointment  Medication Therapy Plan:  Needs an OV with PCP, Labs (CBC, CMP)    Medication Reconciliation Completed: No   Comments:     Provider Staff:     Action is required for this patient.   Please see care gap opportunities below in Care Due Message.     Thanks!  [facility]     Appointments      Date Provider   Last Visit   6/29/2021 ANTONELLA Chaidez MD   Next Visit   Visit date not found ANTONELLA Chaidez MD     Note composed:4:08 PM 04/18/2022           Note composed:4:08 PM 04/18/2022

## 2022-04-18 NOTE — TELEPHONE ENCOUNTER
Care Due:                  Date            Visit Type   Department     Provider  --------------------------------------------------------------------------------                                EP -                              PRIMARY      [facility] FAMILY  Last Visit: 06-      CARE ([facility])   MEDICINE       ANTONELLA LAW  Next Visit: None Scheduled  None         None Found                                                            Last  Test          Frequency    Reason                     Performed    Due Date  --------------------------------------------------------------------------------    Office Visit  12 months..  EScitalopram,              06- 06-                             bisoproloL-hydrochlorothi                             azide, losartan,                             montelukast, omeprazole,                             valACYclovir.............    CBC.........  12 months..  valACYclovir.............  06- 06-    CMP.........  12 months..  bisoproloL-hydrochlorothi  06- 06-                             azide, losartan,                             valACYclovir.............    Powered by [facility] [employer]. Reference number: [id number].   4/18/2022 1:01:44 AM CDT

## 2022-05-31 ENCOUNTER — PATIENT MESSAGE (OUTPATIENT)
Dept: ADMINISTRATIVE | Facility: HOSPITAL | Age: 52
End: 2022-05-31
Payer: COMMERCIAL

## 2022-07-29 ENCOUNTER — PATIENT MESSAGE (OUTPATIENT)
Dept: ADMINISTRATIVE | Facility: HOSPITAL | Age: 52
End: 2022-07-29
Payer: COMMERCIAL

## 2022-12-12 ENCOUNTER — PATIENT MESSAGE (OUTPATIENT)
Dept: ADMINISTRATIVE | Facility: HOSPITAL | Age: 52
End: 2022-12-12
Payer: COMMERCIAL

## 2022-12-19 ENCOUNTER — PATIENT MESSAGE (OUTPATIENT)
Dept: ADMINISTRATIVE | Facility: HOSPITAL | Age: 52
End: 2022-12-19
Payer: COMMERCIAL

## 2022-12-21 DIAGNOSIS — I10 ESSENTIAL HYPERTENSION: ICD-10-CM

## 2022-12-21 NOTE — TELEPHONE ENCOUNTER
Care Due:                  Date            Visit Type   Department     Provider  --------------------------------------------------------------------------------                                EP -                              PRIMARY      [facility] FAMILY  Last Visit: 06-      CARE ([facility])   MEDICINE       ANTONELLA LAW  Next Visit: None Scheduled  None         None Found                                                            Last  Test          Frequency    Reason                     Performed    Due Date  --------------------------------------------------------------------------------    Office Visit  12 months..  EScitalopram,              06- 06-                             bisoproloL-hydrochlorothi                             azide, losartan,                             omeprazole...............    CMP.........  12 months..  bisoproloL-hydrochlorothi  06- 06-                             azide, losartan..........    [employer] Embedded Care Gaps. Reference number: [id number]. 12/21/2022   12:30:44 AM CST

## 2022-12-22 RX ORDER — BISOPROLOL FUMARATE AND HYDROCHLOROTHIAZIDE 10; 6.25 MG/1; MG/1
TABLET ORAL
Qty: 30 TABLET | Refills: 0 | Status: SHIPPED | OUTPATIENT
Start: 2022-12-22

## (undated) DEVICE — DEKA HISCAN SCANNER

## (undated) DEVICE — DEVICE SINUS BLLN INFLATION

## (undated) DEVICE — DEKA MICROMANIPULATOR

## (undated) DEVICE — SYS BLLN DIL 16X40MM

## (undated) DEVICE — DEKA CO2 LASER

## (undated) DEVICE — ACUPULSE DUO CO2 LASER

## (undated) DEVICE — KIT ANTIFOG

## (undated) DEVICE — TRAY ENT 4/CS

## (undated) DEVICE — SYR SLIP TIP 1CC

## (undated) DEVICE — Device

## (undated) DEVICE — SEE MEDLINE ITEM 146313

## (undated) DEVICE — SPONGE PATTY SURGICAL .5X3IN

## (undated) DEVICE — NDL ORO-TRACHEAL INJECTION

## (undated) DEVICE — CATH BLLN INSPIRA AIR 14X40MM

## (undated) DEVICE — SYR DISP LL 5CC

## (undated) DEVICE — CONTAINER SPECIMEN STRL 4OZ

## (undated) DEVICE — SEE MEDLINE ITEM 152622

## (undated) DEVICE — DRESSING SURGICAL 1/2X1/2